# Patient Record
Sex: MALE | Race: WHITE | NOT HISPANIC OR LATINO | Employment: OTHER | URBAN - METROPOLITAN AREA
[De-identification: names, ages, dates, MRNs, and addresses within clinical notes are randomized per-mention and may not be internally consistent; named-entity substitution may affect disease eponyms.]

---

## 2023-02-21 LAB
ALANINE AMINOTRANSFERASE (SGPT) (U/L) IN SER/PLAS: 19 U/L (ref 10–52)
ASPARTATE AMINOTRANSFERASE (SGOT) (U/L) IN SER/PLAS: 19 U/L (ref 9–39)
CHOLESTEROL (MG/DL) IN SER/PLAS: 146 MG/DL (ref 0–199)
CHOLESTEROL IN HDL (MG/DL) IN SER/PLAS: 44.3 MG/DL
CHOLESTEROL/HDL RATIO: 3.3
LDL: 87 MG/DL (ref 0–99)
PROSTATE SPECIFIC AG (NG/ML) IN SER/PLAS: 3.25 NG/ML (ref 0–4)
TRIGLYCERIDE (MG/DL) IN SER/PLAS: 76 MG/DL (ref 0–149)
VLDL: 15 MG/DL (ref 0–40)

## 2023-07-31 ENCOUNTER — HOSPITAL ENCOUNTER (OUTPATIENT)
Dept: DATA CONVERSION | Facility: HOSPITAL | Age: 58
End: 2023-07-31
Attending: SURGERY | Admitting: SURGERY
Payer: COMMERCIAL

## 2023-07-31 DIAGNOSIS — Z12.11 ENCOUNTER FOR SCREENING FOR MALIGNANT NEOPLASM OF COLON: ICD-10-CM

## 2023-07-31 DIAGNOSIS — K51.40 INFLAMMATORY POLYPS OF COLON WITHOUT COMPLICATIONS (MULTI): ICD-10-CM

## 2023-08-07 LAB
COMPLETE PATHOLOGY REPORT: NORMAL
CONVERTED CLINICAL DIAGNOSIS-HISTORY: NORMAL
CONVERTED FINAL DIAGNOSIS: NORMAL
CONVERTED FINAL REPORT PDF LINK TO COPY AND PASTE: NORMAL
CONVERTED GROSS DESCRIPTION: NORMAL

## 2023-08-22 DIAGNOSIS — K21.9 GASTROESOPHAGEAL REFLUX DISEASE WITHOUT ESOPHAGITIS: Primary | ICD-10-CM

## 2023-08-22 DIAGNOSIS — K21.9 GASTROESOPHAGEAL REFLUX DISEASE WITHOUT ESOPHAGITIS: ICD-10-CM

## 2023-08-22 RX ORDER — OMEPRAZOLE 20 MG/1
20 CAPSULE, DELAYED RELEASE ORAL DAILY
Qty: 30 CAPSULE | Refills: 0 | Status: SHIPPED | OUTPATIENT
Start: 2023-08-22 | End: 2023-09-11 | Stop reason: SDUPTHER

## 2023-08-22 RX ORDER — OMEPRAZOLE 20 MG/1
20 CAPSULE, DELAYED RELEASE ORAL DAILY
Qty: 90 CAPSULE | OUTPATIENT
Start: 2023-08-22

## 2023-08-22 RX ORDER — OMEPRAZOLE 20 MG/1
20 CAPSULE, DELAYED RELEASE ORAL DAILY
COMMUNITY
End: 2023-08-22 | Stop reason: SDUPTHER

## 2023-08-23 RX ORDER — OMEPRAZOLE 20 MG/1
20 CAPSULE, DELAYED RELEASE ORAL DAILY
Qty: 90 CAPSULE | OUTPATIENT
Start: 2023-08-23

## 2023-08-28 ENCOUNTER — APPOINTMENT (OUTPATIENT)
Dept: PRIMARY CARE | Facility: CLINIC | Age: 58
End: 2023-08-28

## 2023-09-02 LAB
ALANINE AMINOTRANSFERASE (SGPT) (U/L) IN SER/PLAS: 18 U/L (ref 10–52)
ASPARTATE AMINOTRANSFERASE (SGOT) (U/L) IN SER/PLAS: 21 U/L (ref 9–39)
CHOLESTEROL (MG/DL) IN SER/PLAS: 146 MG/DL (ref 0–199)
CHOLESTEROL IN HDL (MG/DL) IN SER/PLAS: 44.1 MG/DL
CHOLESTEROL/HDL RATIO: 3.3
LDL: 79 MG/DL (ref 0–99)
TRIGLYCERIDE (MG/DL) IN SER/PLAS: 115 MG/DL (ref 0–149)
VLDL: 23 MG/DL (ref 0–40)

## 2023-09-11 ENCOUNTER — OFFICE VISIT (OUTPATIENT)
Dept: PRIMARY CARE | Facility: CLINIC | Age: 58
End: 2023-09-11
Payer: COMMERCIAL

## 2023-09-11 VITALS
TEMPERATURE: 98.6 F | OXYGEN SATURATION: 96 % | BODY MASS INDEX: 30.99 KG/M2 | WEIGHT: 216 LBS | HEART RATE: 94 BPM | SYSTOLIC BLOOD PRESSURE: 122 MMHG | DIASTOLIC BLOOD PRESSURE: 78 MMHG

## 2023-09-11 DIAGNOSIS — I25.10 CORONARY ARTERY DISEASE INVOLVING NATIVE CORONARY ARTERY OF NATIVE HEART WITHOUT ANGINA PECTORIS: ICD-10-CM

## 2023-09-11 DIAGNOSIS — K21.9 GASTROESOPHAGEAL REFLUX DISEASE WITHOUT ESOPHAGITIS: Primary | ICD-10-CM

## 2023-09-11 DIAGNOSIS — Z12.5 SCREENING FOR MALIGNANT NEOPLASM OF PROSTATE: ICD-10-CM

## 2023-09-11 DIAGNOSIS — E78.5 HYPERLIPIDEMIA, UNSPECIFIED HYPERLIPIDEMIA TYPE: ICD-10-CM

## 2023-09-11 DIAGNOSIS — Z23 ENCOUNTER FOR IMMUNIZATION: ICD-10-CM

## 2023-09-11 PROCEDURE — 99214 OFFICE O/P EST MOD 30 MIN: CPT | Performed by: PHYSICIAN ASSISTANT

## 2023-09-11 PROCEDURE — 1036F TOBACCO NON-USER: CPT | Performed by: PHYSICIAN ASSISTANT

## 2023-09-11 PROCEDURE — 90471 IMMUNIZATION ADMIN: CPT | Performed by: PHYSICIAN ASSISTANT

## 2023-09-11 PROCEDURE — 90686 IIV4 VACC NO PRSV 0.5 ML IM: CPT | Performed by: PHYSICIAN ASSISTANT

## 2023-09-11 RX ORDER — FLUTICASONE PROPIONATE 50 MCG
2 SPRAY, SUSPENSION (ML) NASAL DAILY
COMMUNITY
Start: 2023-07-17 | End: 2023-09-11 | Stop reason: WASHOUT

## 2023-09-11 RX ORDER — ACETAMINOPHEN 160 MG/5ML
SUSPENSION, ORAL (FINAL DOSE FORM) ORAL DAILY
COMMUNITY

## 2023-09-11 RX ORDER — ATORVASTATIN CALCIUM 40 MG/1
40 TABLET, FILM COATED ORAL NIGHTLY
COMMUNITY
Start: 2022-11-17 | End: 2023-11-15 | Stop reason: SDUPTHER

## 2023-09-11 RX ORDER — ZINC GLUCONATE 50 MG
1 TABLET ORAL DAILY
COMMUNITY
Start: 2021-10-26

## 2023-09-11 RX ORDER — ASPIRIN 81 MG/1
81 TABLET ORAL DAILY
COMMUNITY

## 2023-09-11 RX ORDER — OMEPRAZOLE 20 MG/1
20 CAPSULE, DELAYED RELEASE ORAL DAILY
Qty: 90 CAPSULE | Refills: 1 | Status: SHIPPED | OUTPATIENT
Start: 2023-09-11 | End: 2024-03-04 | Stop reason: SDUPTHER

## 2023-09-11 RX ORDER — AZELASTINE 1 MG/ML
2 SPRAY, METERED NASAL 2 TIMES DAILY
COMMUNITY
Start: 2023-06-14 | End: 2023-09-11 | Stop reason: WASHOUT

## 2023-09-11 ASSESSMENT — ENCOUNTER SYMPTOMS
PALPITATIONS: 0
SHORTNESS OF BREATH: 0
ABDOMINAL PAIN: 0

## 2023-09-11 NOTE — PROGRESS NOTES
Subjective   Patient ID: Marcello Cowan is a 58 y.o. male who presents for GERD (Recheck) and Hyperlipidemia (Review BW).    HPI   Patient presents for hyperlipidemia, GERD, and CAD.      GERD: Condition has been well controlled since last visit.  Taking omeprazole as prescribed No medication side effects noted.  Denies abdominal pain.  No breakthrough reflux.      Hyperlipidemia: Taking Atorvastatin as prescribed. No medication side effects noted  Doing exercise every other day.    LDL (mg/dL)   Date Value   09/02/2023 79   02/21/2023 87   08/31/2022 73   12/22/2021 81     Triglycerides (mg/dL)   Date Value   09/02/2023 115   02/21/2023 76     HDL (mg/dL)   Date Value   09/02/2023 44.1   02/21/2023 44.3     CAD: Doing well. Sees cardiologist Dr Roca --last there 9/7/23.   Denies CP or SOB. Supposed to follow up in a year.     Did a lot of traveling this summer so not as good with diet. Trying to restart eating mediterranean diet.    Did colonoscopy with Dr Clifford on 7/31/23 that showed inflammatory polyp. Repeat in 10 years.      reports that he has never smoked. He has quit using smokeless tobacco.  His smokeless tobacco use included chew.      Review of Systems   Respiratory:  Negative for shortness of breath.    Cardiovascular:  Negative for chest pain and palpitations.   Gastrointestinal:  Negative for abdominal pain.      reports that he has never smoked. He has quit using smokeless tobacco.  His smokeless tobacco use included chew.    Review of Systems   Respiratory:  Negative for shortness of breath.    Cardiovascular:  Negative for chest pain and palpitations.   Gastrointestinal:  Negative for abdominal pain.     Objective   /78   Pulse 94   Temp 37 °C (98.6 °F)   Wt 98 kg (216 lb)   SpO2 96%   BMI 30.99 kg/m²     Physical Exam  Vitals and nursing note reviewed.   Constitutional:       Appearance: Normal appearance. He is well-developed.   Eyes:      General: No scleral icterus.  Cardiovascular:       Rate and Rhythm: Normal rate and regular rhythm.      Heart sounds: No murmur heard.  Pulmonary:      Effort: Pulmonary effort is normal.      Breath sounds: Normal breath sounds.   Abdominal:      Palpations: Abdomen is soft. There is no mass.      Tenderness: There is no abdominal tenderness.   Musculoskeletal:      Cervical back: Neck supple.   Skin:     General: Skin is warm and dry.   Neurological:      Mental Status: He is alert.       Assessment/Plan   Diagnoses and all orders for this visit:  GERD  -     omeprazole (PriLOSEC) 20 mg DR capsule; Take 1 capsule (20 mg) by mouth once daily.  Hyperlipidemia, unspecified hyperlipidemia type  -     Aspartate Aminotransferase; Future  -     Alanine Aminotransferase; Future  -     Lipid Panel; Future  CAD  -     Lipid Panel; Future  Screening for malignant neoplasm of prostate  -     Prostate Specific Antigen; Future  Encounter for immunization  -     Flu vaccine (IIV4) age 6 months and greater, preservative free       Reviewed labs.   Discussed diet and exercise, and encouraged weight loss.   Refilled meds.   Flu shot given.   Follow up in 6 months for recheck GERD, hyperlipidemia, CAD with fasting labs the week before.

## 2023-09-29 VITALS — HEIGHT: 70 IN | WEIGHT: 209.44 LBS | BODY MASS INDEX: 29.98 KG/M2

## 2023-11-15 DIAGNOSIS — E78.5 HYPERLIPIDEMIA, UNSPECIFIED HYPERLIPIDEMIA TYPE: Primary | ICD-10-CM

## 2023-11-15 RX ORDER — ATORVASTATIN CALCIUM 40 MG/1
40 TABLET, FILM COATED ORAL NIGHTLY
Qty: 90 TABLET | Refills: 3 | Status: SHIPPED | OUTPATIENT
Start: 2023-11-15 | End: 2024-11-14

## 2023-11-15 NOTE — TELEPHONE ENCOUNTER
Received refill request from [facility] for Atorvastatin 40 mg    Last appointment: 9/7/23 with Dr. Roca  Next appointment: 9/10/24 with Dr. Roca  Labs labs: 9/2/23 Lipid checked.   10/2021 saw Lamar Reyes

## 2024-02-21 DIAGNOSIS — K21.9 GASTROESOPHAGEAL REFLUX DISEASE WITHOUT ESOPHAGITIS: ICD-10-CM

## 2024-02-21 RX ORDER — OMEPRAZOLE 20 MG/1
20 CAPSULE, DELAYED RELEASE ORAL DAILY
Qty: 90 CAPSULE | Refills: 1 | OUTPATIENT
Start: 2024-02-21

## 2024-02-27 ENCOUNTER — LAB (OUTPATIENT)
Dept: LAB | Facility: LAB | Age: 59
End: 2024-02-27
Payer: COMMERCIAL

## 2024-02-27 DIAGNOSIS — I25.10 CORONARY ARTERY DISEASE INVOLVING NATIVE CORONARY ARTERY OF NATIVE HEART WITHOUT ANGINA PECTORIS: ICD-10-CM

## 2024-02-27 DIAGNOSIS — Z12.5 SCREENING FOR MALIGNANT NEOPLASM OF PROSTATE: ICD-10-CM

## 2024-02-27 DIAGNOSIS — E78.5 HYPERLIPIDEMIA, UNSPECIFIED HYPERLIPIDEMIA TYPE: ICD-10-CM

## 2024-02-27 LAB
ALT SERPL W P-5'-P-CCNC: 18 U/L (ref 10–52)
AST SERPL W P-5'-P-CCNC: 21 U/L (ref 9–39)
CHOLEST SERPL-MCNC: 167 MG/DL (ref 0–199)
CHOLESTEROL/HDL RATIO: 3.4
HDLC SERPL-MCNC: 49.6 MG/DL
LDLC SERPL CALC-MCNC: 94 MG/DL
NON HDL CHOLESTEROL: 117 MG/DL (ref 0–149)
PSA SERPL-MCNC: 5.13 NG/ML
TRIGL SERPL-MCNC: 118 MG/DL (ref 0–149)
VLDL: 24 MG/DL (ref 0–40)

## 2024-02-27 PROCEDURE — 84460 ALANINE AMINO (ALT) (SGPT): CPT

## 2024-02-27 PROCEDURE — 36415 COLL VENOUS BLD VENIPUNCTURE: CPT

## 2024-02-27 PROCEDURE — 80061 LIPID PANEL: CPT

## 2024-02-27 PROCEDURE — 84450 TRANSFERASE (AST) (SGOT): CPT

## 2024-02-27 PROCEDURE — 84153 ASSAY OF PSA TOTAL: CPT

## 2024-03-04 ENCOUNTER — OFFICE VISIT (OUTPATIENT)
Dept: PRIMARY CARE | Facility: CLINIC | Age: 59
End: 2024-03-04
Payer: COMMERCIAL

## 2024-03-04 VITALS
HEART RATE: 68 BPM | SYSTOLIC BLOOD PRESSURE: 122 MMHG | OXYGEN SATURATION: 99 % | BODY MASS INDEX: 30.85 KG/M2 | DIASTOLIC BLOOD PRESSURE: 80 MMHG | WEIGHT: 215 LBS | TEMPERATURE: 98 F

## 2024-03-04 DIAGNOSIS — K21.9 GASTROESOPHAGEAL REFLUX DISEASE WITHOUT ESOPHAGITIS: Primary | ICD-10-CM

## 2024-03-04 DIAGNOSIS — I25.10 CORONARY ARTERY DISEASE INVOLVING NATIVE CORONARY ARTERY OF NATIVE HEART WITHOUT ANGINA PECTORIS: ICD-10-CM

## 2024-03-04 DIAGNOSIS — R97.20 ELEVATED PSA: ICD-10-CM

## 2024-03-04 DIAGNOSIS — K51.40 PSEUDOPOLYPOSIS OF COLON WITHOUT COMPLICATION, UNSPECIFIED PART OF COLON (MULTI): ICD-10-CM

## 2024-03-04 DIAGNOSIS — E78.5 HYPERLIPIDEMIA, UNSPECIFIED HYPERLIPIDEMIA TYPE: ICD-10-CM

## 2024-03-04 PROBLEM — J30.9 ALLERGIC RHINITIS: Status: ACTIVE | Noted: 2023-06-12

## 2024-03-04 PROCEDURE — 1036F TOBACCO NON-USER: CPT | Performed by: PHYSICIAN ASSISTANT

## 2024-03-04 PROCEDURE — 99214 OFFICE O/P EST MOD 30 MIN: CPT | Performed by: PHYSICIAN ASSISTANT

## 2024-03-04 RX ORDER — OMEPRAZOLE 20 MG/1
20 CAPSULE, DELAYED RELEASE ORAL DAILY
Qty: 90 CAPSULE | Refills: 1 | Status: SHIPPED | OUTPATIENT
Start: 2024-03-04

## 2024-03-04 ASSESSMENT — ENCOUNTER SYMPTOMS
PALPITATIONS: 0
ABDOMINAL PAIN: 0
SHORTNESS OF BREATH: 0

## 2024-03-04 NOTE — PROGRESS NOTES
Subjective   Patient ID: Marcello Cowan is a 58 y.o. male who presents for Coronary Artery Disease, GERD, and Hyperlipidemia (Review BW).    HPI   Patient presents for hyperlipidemia, GERD, and CAD.      GERD: Condition has been well controlled since last visit.  Taking and tolerating omeprazole as prescribed. Denies abdominal pain.  No breakthrough reflux.      Hyperlipidemia: Taking Atorvastatin as prescribed. No medication side effects noted  Doing exercise every day since 1/6/24 (treadmill/bike and wts).  Started losing wt again. Eating mediterranean diet.  LDL Calculated (mg/dL)   Date Value   02/27/2024 94     LDL (mg/dL)   Date Value   09/02/2023 79   02/21/2023 87     Triglycerides (mg/dL)   Date Value   02/27/2024 118   09/02/2023 115   02/21/2023 76     HDL-Cholesterol (mg/dL)   Date Value   02/27/2024 49.6     HDL (mg/dL)   Date Value   09/02/2023 44.1   02/21/2023 44.3       CAD: Doing well. Sees cardiologist Dr Roca --last there 9/7/23.   Denies CP or SOB. Supposed to follow up in a year.       Did colonoscopy with Dr Clifford on 7/31/23 that showed inflammatory polyp. Repeat in 10 years.     PSA rising.  Dad had prostate cancer.   No urinary symptoms.   Lab Results   Component Value Date    PSA 5.13 (H) 02/27/2024    PSA 3.25 02/21/2023    PSA 3.64 12/22/2021        reports that he has never smoked. He quit smokeless tobacco use about 5 years ago.  His smokeless tobacco use included chew.      Review of Systems   Respiratory:  Negative for shortness of breath.    Cardiovascular:  Negative for chest pain and palpitations.   Gastrointestinal:  Negative for abdominal pain.     Objective   /80   Pulse 68   Temp 36.7 °C (98 °F)   Wt 97.5 kg (215 lb)   SpO2 99%   BMI 30.85 kg/m²     Physical Exam  Vitals and nursing note reviewed.   Constitutional:       Appearance: Normal appearance. He is well-developed.   Eyes:      General: No scleral icterus.  Cardiovascular:      Rate and Rhythm: Normal rate  and regular rhythm.      Heart sounds: No murmur heard.  Pulmonary:      Effort: Pulmonary effort is normal.      Breath sounds: Normal breath sounds.   Abdominal:      Palpations: Abdomen is soft. There is no mass.      Tenderness: There is no abdominal tenderness.   Musculoskeletal:      Cervical back: Neck supple.   Skin:     General: Skin is warm and dry.   Neurological:      Mental Status: He is alert.       Assessment/Plan   Diagnoses and all orders for this visit:  GERD  -     omeprazole (PriLOSEC) 20 mg DR capsule; Take 1 capsule (20 mg) by mouth once daily.  Hyperlipidemia, unspecified hyperlipidemia type  -     Comprehensive Metabolic Panel; Future  -     Lipid Panel; Future  CAD  -     Comprehensive Metabolic Panel; Future  Elevated PSA  -     Referral to Urology; Future  Pseudopolyposis of colon without complication, unspecified part of colon (CMS/HCC)     Reviewed labs.   Discussed diet and exercise, and encouraged weight loss.   Refilled meds.   Referred to urologist, Dr Perdomo due to rising PSA.   Follow up in 6 months for recheck GERD, hyperlipidemia, CAD with fasting labs the week before.

## 2024-04-04 ENCOUNTER — OFFICE VISIT (OUTPATIENT)
Dept: UROLOGY | Facility: CLINIC | Age: 59
End: 2024-04-04
Payer: COMMERCIAL

## 2024-04-04 DIAGNOSIS — N40.1 BENIGN PROSTATIC HYPERPLASIA WITH LOWER URINARY TRACT SYMPTOMS, SYMPTOM DETAILS UNSPECIFIED: Primary | ICD-10-CM

## 2024-04-04 DIAGNOSIS — R97.20 ELEVATED PSA: ICD-10-CM

## 2024-04-04 LAB
POC BILIRUBIN, URINE: NEGATIVE
POC BLOOD, URINE: NEGATIVE
POC GLUCOSE, URINE: NEGATIVE MG/DL
POC KETONES, URINE: NEGATIVE MG/DL
POC LEUKOCYTES, URINE: NEGATIVE
POC NITRITE,URINE: NEGATIVE
POC PH, URINE: 5 PH
POC PROTEIN, URINE: NEGATIVE MG/DL
POC SPECIFIC GRAVITY, URINE: >=1.03
POC UROBILINOGEN, URINE: 0.2 EU/DL

## 2024-04-04 PROCEDURE — 81003 URINALYSIS AUTO W/O SCOPE: CPT | Performed by: UROLOGY

## 2024-04-04 PROCEDURE — 99203 OFFICE O/P NEW LOW 30 MIN: CPT | Performed by: UROLOGY

## 2024-04-04 NOTE — PROGRESS NOTES
04/04/2024  58-year-old gentleman presents an elevated PSA 5.13.  Voiding fine, nocturia 0-1    Patient has no nausea, no vomiting, no fever.    KORIN: 1+, no nodule    Exam: Circumcised, normal penis, normal testes bilaterally    We discussed mild elevated PSA, prostate biopsy now versus monitoring PSA  We discussed benign prostate hypertrophy with mild voiding symptom  All the questions were answered, the patient expressed understanding and agreed to the plan.    Impression  Elevated PSA-mild  BPH-mild symptoms    Plan  Conservative management  Repeat PSA in 6 months  Watch voiding  Appointment in 6 months    Chief Complaint   Patient presents with    Elevated PSA     Patient here today as a new patient for elevated PSA.  Patient states urine flow has changed, nocturia x 1  PSA 2/27/24  5.13        Physical Exam     TODAYS LAB RESULTS:      Component  Ref Range & Units 10:20   POC Glucose, Urine  NEGATIVE mg/dl NEGATIVE   POC Bilirubin, Urine  NEGATIVE NEGATIVE   POC Ketones, Urine  NEGATIVE mg/dl NEGATIVE   POC Specific Gravity, Urine  1.005 - 1.035 >=1.030   POC Blood, Urine  NEGATIVE NEGATIVE   POC PH, Urine  No Reference Range Established PH 5.0   POC Protein, Urine  NEGATIVE, 30 (1+) mg/dl NEGATIVE   POC Urobilinogen, Urine  0.2, 1.0 EU/DL 0.2   Poc Nitrite, Urine  NEGATIVE NEGATIVE   POC Leukocytes, Urine  NEGATIVE NEGATIVE       ASSESSMENT&PLAN:      IMPRESSIONS:    PSA  2/27/2024 5.13  2/21/2023 3.25  12/22/2021 3.64  11/3/2020 2.71  10/5/2019 2.81

## 2024-08-14 DIAGNOSIS — K21.9 GASTROESOPHAGEAL REFLUX DISEASE WITHOUT ESOPHAGITIS: ICD-10-CM

## 2024-08-19 RX ORDER — OMEPRAZOLE 20 MG/1
20 CAPSULE, DELAYED RELEASE ORAL DAILY
Qty: 90 CAPSULE | Refills: 1 | OUTPATIENT
Start: 2024-08-19

## 2024-08-30 ENCOUNTER — LAB (OUTPATIENT)
Dept: LAB | Facility: LAB | Age: 59
End: 2024-08-30
Payer: COMMERCIAL

## 2024-08-30 DIAGNOSIS — I25.10 CORONARY ARTERY DISEASE INVOLVING NATIVE CORONARY ARTERY OF NATIVE HEART WITHOUT ANGINA PECTORIS: ICD-10-CM

## 2024-08-30 DIAGNOSIS — E78.5 HYPERLIPIDEMIA, UNSPECIFIED HYPERLIPIDEMIA TYPE: ICD-10-CM

## 2024-08-30 LAB
ALBUMIN SERPL BCP-MCNC: 4.3 G/DL (ref 3.4–5)
ALP SERPL-CCNC: 65 U/L (ref 33–136)
ALT SERPL W P-5'-P-CCNC: 18 U/L (ref 10–52)
ANION GAP SERPL CALC-SCNC: 11 MMOL/L (ref 10–20)
AST SERPL W P-5'-P-CCNC: 19 U/L (ref 9–39)
BILIRUB SERPL-MCNC: 0.6 MG/DL (ref 0–1.2)
BUN SERPL-MCNC: 17 MG/DL (ref 6–23)
CALCIUM SERPL-MCNC: 8.6 MG/DL (ref 8.6–10.3)
CHLORIDE SERPL-SCNC: 106 MMOL/L (ref 98–107)
CHOLEST SERPL-MCNC: 152 MG/DL (ref 0–199)
CHOLESTEROL/HDL RATIO: 3.7
CO2 SERPL-SCNC: 27 MMOL/L (ref 21–32)
CREAT SERPL-MCNC: 0.71 MG/DL (ref 0.5–1.3)
EGFRCR SERPLBLD CKD-EPI 2021: >90 ML/MIN/1.73M*2
GLUCOSE SERPL-MCNC: 107 MG/DL (ref 74–99)
HDLC SERPL-MCNC: 40.9 MG/DL
LDLC SERPL CALC-MCNC: 82 MG/DL
NON HDL CHOLESTEROL: 111 MG/DL (ref 0–149)
POTASSIUM SERPL-SCNC: 4.1 MMOL/L (ref 3.5–5.3)
PROT SERPL-MCNC: 6.4 G/DL (ref 6.4–8.2)
SODIUM SERPL-SCNC: 140 MMOL/L (ref 136–145)
TRIGL SERPL-MCNC: 148 MG/DL (ref 0–149)
VLDL: 30 MG/DL (ref 0–40)

## 2024-08-30 PROCEDURE — 80053 COMPREHEN METABOLIC PANEL: CPT

## 2024-08-30 PROCEDURE — 36415 COLL VENOUS BLD VENIPUNCTURE: CPT

## 2024-08-30 PROCEDURE — 80061 LIPID PANEL: CPT

## 2024-09-03 ENCOUNTER — LAB (OUTPATIENT)
Dept: LAB | Facility: LAB | Age: 59
End: 2024-09-03
Payer: COMMERCIAL

## 2024-09-03 DIAGNOSIS — R97.20 ELEVATED PSA: ICD-10-CM

## 2024-09-03 DIAGNOSIS — N40.1 BENIGN PROSTATIC HYPERPLASIA WITH LOWER URINARY TRACT SYMPTOMS, SYMPTOM DETAILS UNSPECIFIED: ICD-10-CM

## 2024-09-03 LAB — PSA SERPL-MCNC: 4.35 NG/ML

## 2024-09-03 PROCEDURE — 36415 COLL VENOUS BLD VENIPUNCTURE: CPT

## 2024-09-03 PROCEDURE — 84153 ASSAY OF PSA TOTAL: CPT

## 2024-09-05 ENCOUNTER — APPOINTMENT (OUTPATIENT)
Dept: PRIMARY CARE | Facility: CLINIC | Age: 59
End: 2024-09-05
Payer: COMMERCIAL

## 2024-09-10 ENCOUNTER — OFFICE VISIT (OUTPATIENT)
Dept: CARDIOLOGY | Facility: HOSPITAL | Age: 59
End: 2024-09-10
Payer: COMMERCIAL

## 2024-09-10 VITALS
HEART RATE: 76 BPM | HEIGHT: 70 IN | WEIGHT: 219 LBS | DIASTOLIC BLOOD PRESSURE: 82 MMHG | BODY MASS INDEX: 31.35 KG/M2 | SYSTOLIC BLOOD PRESSURE: 118 MMHG

## 2024-09-10 DIAGNOSIS — I25.10 CORONARY ARTERY DISEASE INVOLVING NATIVE CORONARY ARTERY OF NATIVE HEART WITHOUT ANGINA PECTORIS: Primary | ICD-10-CM

## 2024-09-10 DIAGNOSIS — R07.9 CHEST PAIN: ICD-10-CM

## 2024-09-10 PROCEDURE — 99213 OFFICE O/P EST LOW 20 MIN: CPT | Performed by: INTERNAL MEDICINE

## 2024-09-10 PROCEDURE — 93005 ELECTROCARDIOGRAM TRACING: CPT | Performed by: INTERNAL MEDICINE

## 2024-09-10 PROCEDURE — 93010 ELECTROCARDIOGRAM REPORT: CPT | Performed by: INTERNAL MEDICINE

## 2024-09-10 PROCEDURE — 1036F TOBACCO NON-USER: CPT | Performed by: INTERNAL MEDICINE

## 2024-09-10 PROCEDURE — 3008F BODY MASS INDEX DOCD: CPT | Performed by: INTERNAL MEDICINE

## 2024-09-10 NOTE — PATIENT INSTRUCTIONS
Continue all current medications as prescribed.  Dr. Roca has ordered a stress test to ensure your heart is getting adequate blood flow and there is no evidence of any blockages within the heart arteries.    Followup with Dr. Roca after the above test.    If you have any questions or cardiac concerns, please call our office at [phone].

## 2024-09-10 NOTE — PROGRESS NOTES
"Counseling:  The patient was counseled regarding diagnostic results, instructions for management, risk factor reductions, prognosis, patient and family education, impressions, risks and benefits of treatment options and importance of compliance with treatment.      Chief Complaint:   The patient presents today for annual followup of CAD and dyslipidemia.     History Of Present Illness:    Marcello Cowan is a 58 year old male patient who presents today for annual followup of CAD and hyperlipidemia. His PMH is significant for CAD s/p PCI of LAD in 09/2019, GERD and hyperlipidemia. Approximately 2 weeks ago, the patient states that he developed fatigue and dizziness while cutting firewood on a hot day. He subsequently developed chest tightness with radiation to his left arm. He reports persistent and almost constant chest discomfort. The patient denies any SOB. BP has been stable. EKG today shows RBBB, which is stable from prior. The patient is compliant with his prescribed medications.        Last Recorded Vitals:  Vitals:    09/10/24 1232   BP: 118/82   Pulse: 76   Weight: 99.3 kg (219 lb)   Height: 1.778 m (5' 10\")       Past Surgical History:  He has a past surgical history that includes Appendectomy (08/29/2017); Other surgical history (06/06/2022); and CT angio aorta and bilateral iliofemoral runoff w and or wo IV contrast (9/18/2019).      Social History:  He reports that he has never smoked. He quit smokeless tobacco use about 5 years ago.  His smokeless tobacco use included chew. He reports that he does not currently use alcohol. He reports that he does not use drugs.    Family History:  Family History   Problem Relation Name Age of Onset    Hypertension Father      Hyperlipidemia Father      Prostate cancer Father          Allergies:  Amoxicillin, Ciprofloxacin, Diltiazem hcl, and Isosorbide mononitrate    Outpatient Medications:  Current Outpatient Medications   Medication Instructions    aspirin 81 mg, oral, " Daily    atorvastatin (LIPITOR) 40 mg, oral, Nightly    coenzyme Q-10 200 mg capsule oral, Daily    fish oil concentrate (Omega-3) 120-180 mg capsule 1 g, oral, Daily    omeprazole (PRILOSEC) 20 mg, oral, Daily    zinc gluconate 50 mg tablet 1 tablet, oral, Daily     Review of Systems   Cardiovascular:  Positive for chest pain.   All other systems reviewed and are negative.     Physical Exam:  Constitutional:       Appearance: Healthy appearance. Not in distress.   Neck:      Vascular: No JVR. JVD normal.   Pulmonary:      Effort: Pulmonary effort is normal.      Breath sounds: Normal breath sounds. No wheezing. No rhonchi. No rales.   Chest:      Chest wall: Not tender to palpatation.   Cardiovascular:      PMI at left midclavicular line. Normal rate. Regular rhythm. Normal S1. Normal S2.       Murmurs: There is no murmur.      No gallop.  No click. No rub.   Pulses:     Intact distal pulses.   Edema:     Peripheral edema absent.   Abdominal:      General: Bowel sounds are normal.      Palpations: Abdomen is soft.      Tenderness: There is no abdominal tenderness.   Musculoskeletal: Normal range of motion.         General: No tenderness. Skin:     General: Skin is warm and dry.   Neurological:      General: No focal deficit present.      Mental Status: Alert and oriented to person, place and time.          Last Labs:  CBC -  Lab Results   Component Value Date    WBC 9.1 05/21/2022    HGB 15.2 05/21/2022    HCT 43.0 05/21/2022    MCV 87 05/21/2022     05/21/2022       CMP -  Lab Results   Component Value Date    CALCIUM 8.6 08/30/2024    PHOS 2.8 09/19/2019    PROT 6.4 08/30/2024    ALBUMIN 4.3 08/30/2024    AST 19 08/30/2024    ALT 18 08/30/2024    ALKPHOS 65 08/30/2024    BILITOT 0.6 08/30/2024       LIPID PANEL -   Lab Results   Component Value Date    CHOL 152 08/30/2024    TRIG 148 08/30/2024    HDL 40.9 08/30/2024    CHHDL 3.7 08/30/2024    LDLF 79 09/02/2023    VLDL 30 08/30/2024    NHDL 111 08/30/2024        RENAL FUNCTION PANEL -   Lab Results   Component Value Date    GLUCOSE 107 (H) 08/30/2024     08/30/2024    K 4.1 08/30/2024     08/30/2024    CO2 27 08/30/2024    ANIONGAP 11 08/30/2024    BUN 17 08/30/2024    CREATININE 0.71 08/30/2024    GFRMALE 79 05/21/2022    CALCIUM 8.6 08/30/2024    PHOS 2.8 09/19/2019    ALBUMIN 4.3 08/30/2024        Lab Results   Component Value Date    HGBA1C 5.9 07/07/2020       Last Cardiology Tests:  08/25/2022 - TTE  1. Left ventricular systolic function is normal with a 60-65% estimated ejection fraction.  2. Moderately increased left ventricular septal thickness.     07/07/2020 - Cardiac Catheterization (LH)  1. Mild non-obstructive coronary artery disease.  2. Left Ventricular end-diastolic pressure = 13.  3. Normal LV filling pressures.     09/19/2019 - Vascular Lab Venous Duplex Ultrasound for DVT  1. Right Lower Venous: No evidence of acute deep vein thrombus visualized in the right lower extremity.  2. Left Lower Venous: No evidence of acute deep vein thrombus visualized in the left lower extremity. A hematoma is noted in the inguinal area at the saphenofemoral junction with some extrinsic compression of the common femoral vein. There is no apparent intraluminal thrombus; repeat imaging in 3-5 days is suggested if clinically appropriate.   3. Additional Findings; Hematoma.  4. Comparison: Compared with study from 9/18/2019, Prior study was a CT scan. There is no evidence of DVT in bilateral lower extremities. A hematoma is noted in the inguinal area at saphenofemoral junction with extrinsic venous compression. This may be below the area imaged on CT.     09/18/2019 - Ultrasound Duplex Lower Extremity Bilateral   1. No evidence of pseudoaneurysm or fluid collection. The evaluated bilateral iliac arteries as well as the common and proximal femoral arteries are unremarkable.  2. Partial thrombus in the left common femoral vein.     09/11/2019 - Cardiac  Catheterization/PCI  1. Single vessel coronary artery disease with proximal left anterior descending involvement.  2. Successful PCI of LAD and septal perforators.     09/03/2019 - Cardiac Catheterization (LH)  1. Single vessel coronary artery disease with proximal left anterior descending involvement.  2. Elevated LV filling pressures.  3. Left Ventricular end-diastolic pressure = 19.  4. Normal LV systolic function.      09/13/2017 - Exercise Stress Echo  1. The resting ejection fraction was estimated at 55 to 60% with a peak exercise ejection fraction estimated at 65 to 70%.  2. Normal left ventricular size and function during peak stress.  3. Normal left ventricular size and function.  4. Negative adequate exercise echo for inducible ischemia at a high (approx 13-14 MET) work load.  5. ST changes as described are less specific than echo findings (particularly given postural changes).  6. Unchanged chest discomfort before, during, and after exercise.  7. The adequate level of stress was achieved.    Lab review: I have personally reviewed the laboratory result(s).    Assessment/Plan   1) CAD s/p PCI of LAD Sept 2019  On ASA 81 mg daily, atorvastatin 40 mg daily  Home monitoring of BP  Approximately 2 weeks ago, patient developed fatigue and dizziness while cutting firewood in hot weather  Subsequently developed chest tightness with radiation to left arm  Reports persistent and almost constant chest discomfort  Denies SOB  BP stable  EKG shows RBBB - stable from prior  Continue current medical Rx   Check nuclear stress  F/U after testing      2) Hyperlipidemia  Goal LDL <70  On atorvastatin 40 mg daily  Lipid panel 08/30/2024 with LDL of 82; not at goal  Continue current medical Rx      3) Intermittent Tachycardia  Apple Watch reveals sinus tachycardia  Normal echo  Negative TSH  Encouraged to exercise  Stable       Scribe Attestation  By signing my name below, I, Caleb Shetty   attest that this  documentation has been prepared under the direction and in the presence of Luciano Roca MD.

## 2024-09-13 ENCOUNTER — HOSPITAL ENCOUNTER (OUTPATIENT)
Dept: RADIOLOGY | Facility: HOSPITAL | Age: 59
Discharge: HOME | End: 2024-09-13
Payer: COMMERCIAL

## 2024-09-13 ENCOUNTER — HOSPITAL ENCOUNTER (OUTPATIENT)
Dept: CARDIOLOGY | Facility: HOSPITAL | Age: 59
Discharge: HOME | End: 2024-09-13
Payer: COMMERCIAL

## 2024-09-13 DIAGNOSIS — R07.9 CHEST PAIN: ICD-10-CM

## 2024-09-13 DIAGNOSIS — I25.10 CORONARY ARTERY DISEASE INVOLVING NATIVE CORONARY ARTERY OF NATIVE HEART WITHOUT ANGINA PECTORIS: ICD-10-CM

## 2024-09-13 PROCEDURE — 93017 CV STRESS TEST TRACING ONLY: CPT

## 2024-09-13 PROCEDURE — 78452 HT MUSCLE IMAGE SPECT MULT: CPT

## 2024-09-13 PROCEDURE — 3430000001 HC RX 343 DIAGNOSTIC RADIOPHARMACEUTICALS: Performed by: STUDENT IN AN ORGANIZED HEALTH CARE EDUCATION/TRAINING PROGRAM

## 2024-09-13 PROCEDURE — 93016 CV STRESS TEST SUPVJ ONLY: CPT | Performed by: STUDENT IN AN ORGANIZED HEALTH CARE EDUCATION/TRAINING PROGRAM

## 2024-09-13 PROCEDURE — 93018 CV STRESS TEST I&R ONLY: CPT | Performed by: STUDENT IN AN ORGANIZED HEALTH CARE EDUCATION/TRAINING PROGRAM

## 2024-09-13 PROCEDURE — A9502 TC99M TETROFOSMIN: HCPCS | Performed by: STUDENT IN AN ORGANIZED HEALTH CARE EDUCATION/TRAINING PROGRAM

## 2024-09-15 NOTE — H&P (VIEW-ONLY)
"Counseling:  The patient was counseled regarding diagnostic results, instructions for management, risk factor reductions, prognosis, patient and family education, impressions, risks and benefits of treatment options and importance of compliance with treatment.      Chief Complaint:   The patient presents today for 1-week followup of chest pain s/p stress test.      History Of Present Illness:    Marcello Cowan is a 59 year old male patient who presents today for 1-week followup of chest pain s/p stress test. His PMH is significant for CAD s/p PCI of LAD in 09/2019, GERD and hyperlipidemia. Stress testing performed 09/13/2024 was negative for ischemia. Today, the patient reports persistent occasional left-sided chest pressure. He also reports a 3-week history of frequent headaches, a symptom he forgot to report at last office visit. These symptoms continue to worry him as they are similar to what he experienced prior to his PCI in 2019.      Last Recorded Vitals:  Vitals:    09/16/24 0949   BP: 134/80   BP Location: Left arm   Pulse: 55   SpO2: 97%   Weight: 99.8 kg (220 lb)   Height: 1.778 m (5' 10\")       Past Surgical History:  He has a past surgical history that includes Appendectomy (08/29/2017); Other surgical history (06/06/2022); and CT angio aorta and bilateral iliofemoral runoff w and or wo IV contrast (9/18/2019).      Social History:  He reports that he has never smoked. He quit smokeless tobacco use about 5 years ago.  His smokeless tobacco use included chew. He reports that he does not currently use alcohol. He reports that he does not use drugs.    Family History:  Family History   Problem Relation Name Age of Onset    Hypertension Father      Hyperlipidemia Father      Prostate cancer Father          Allergies:  Amoxicillin, Ciprofloxacin, Diltiazem hcl, and Isosorbide mononitrate    Outpatient Medications:  Current Outpatient Medications   Medication Instructions    aspirin 81 mg, oral, Daily    " atorvastatin (LIPITOR) 40 mg, oral, Nightly    coenzyme Q-10 200 mg capsule oral, Daily    fish oil concentrate (Omega-3) 120-180 mg capsule 1 g, oral, Daily    omeprazole (PRILOSEC) 20 mg, oral, Daily    zinc gluconate 50 mg tablet 1 tablet, oral, Daily     Review of Systems   Cardiovascular:         Occasional left-sided chest pressure   Neurological:  Positive for headaches.   All other systems reviewed and are negative.     Physical Exam:  Constitutional:       Appearance: Healthy appearance. Not in distress.   Neck:      Vascular: No JVR. JVD normal.   Pulmonary:      Effort: Pulmonary effort is normal.      Breath sounds: Normal breath sounds. No wheezing. No rhonchi. No rales.   Chest:      Chest wall: Not tender to palpatation.   Cardiovascular:      PMI at left midclavicular line. Normal rate. Regular rhythm. Normal S1. Normal S2.       Murmurs: There is no murmur.      No gallop.  No click. No rub.   Pulses:     Intact distal pulses.   Edema:     Peripheral edema absent.   Abdominal:      General: Bowel sounds are normal.      Palpations: Abdomen is soft.      Tenderness: There is no abdominal tenderness.   Musculoskeletal: Normal range of motion.         General: No tenderness. Skin:     General: Skin is warm and dry.   Neurological:      General: No focal deficit present.      Mental Status: Alert and oriented to person, place and time.          Last Labs:  CBC -  Lab Results   Component Value Date    WBC 9.1 05/21/2022    HGB 15.2 05/21/2022    HCT 43.0 05/21/2022    MCV 87 05/21/2022     05/21/2022       CMP -  Lab Results   Component Value Date    CALCIUM 8.6 08/30/2024    PHOS 2.8 09/19/2019    PROT 6.4 08/30/2024    ALBUMIN 4.3 08/30/2024    AST 19 08/30/2024    ALT 18 08/30/2024    ALKPHOS 65 08/30/2024    BILITOT 0.6 08/30/2024       LIPID PANEL -   Lab Results   Component Value Date    CHOL 152 08/30/2024    TRIG 148 08/30/2024    HDL 40.9 08/30/2024    CHHDL 3.7 08/30/2024    LDLF 79  09/02/2023    VLDL 30 08/30/2024    NHDL 111 08/30/2024       RENAL FUNCTION PANEL -   Lab Results   Component Value Date    GLUCOSE 107 (H) 08/30/2024     08/30/2024    K 4.1 08/30/2024     08/30/2024    CO2 27 08/30/2024    ANIONGAP 11 08/30/2024    BUN 17 08/30/2024    CREATININE 0.71 08/30/2024    GFRMALE 79 05/21/2022    CALCIUM 8.6 08/30/2024    PHOS 2.8 09/19/2019    ALBUMIN 4.3 08/30/2024        Lab Results   Component Value Date    HGBA1C 5.9 07/07/2020       Last Cardiology Tests:  09/13/2024 - Stress Test  1. SPECT myocardial perfusion stress test in response to exercise stress demonstrates no evidence of reversible ischemia or infarction. Normal LV ejection fraction of 61%. Moderate coronary artery calcifications are noted.   2. Adequate level of stress achieved. No clinical evidence for ischemia at maximal workload. Borderline EKG abnormalities possibly related to underlying RBBB vs ischemic changes.    08/25/2022 - TTE  1. Left ventricular systolic function is normal with a 60-65% estimated ejection fraction.  2. Moderately increased left ventricular septal thickness.     07/07/2020 - Cardiac Catheterization (LH)  1. Mild non-obstructive coronary artery disease.  2. Left Ventricular end-diastolic pressure = 13.  3. Normal LV filling pressures.     09/19/2019 - Vascular Lab Venous Duplex Ultrasound for DVT  1. Right Lower Venous: No evidence of acute deep vein thrombus visualized in the right lower extremity.  2. Left Lower Venous: No evidence of acute deep vein thrombus visualized in the left lower extremity. A hematoma is noted in the inguinal area at the saphenofemoral junction with some extrinsic compression of the common femoral vein. There is no apparent intraluminal thrombus; repeat imaging in 3-5 days is suggested if clinically appropriate.   3. Additional Findings; Hematoma.  4. Comparison: Compared with study from 9/18/2019, Prior study was a CT scan. There is no evidence of DVT in  bilateral lower extremities. A hematoma is noted in the inguinal area at saphenofemoral junction with extrinsic venous compression. This may be below the area imaged on CT.     09/18/2019 - Ultrasound Duplex Lower Extremity Bilateral   1. No evidence of pseudoaneurysm or fluid collection. The evaluated bilateral iliac arteries as well as the common and proximal femoral arteries are unremarkable.  2. Partial thrombus in the left common femoral vein.     09/11/2019 - Cardiac Catheterization/PCI  1. Single vessel coronary artery disease with proximal left anterior descending involvement.  2. Successful PCI of LAD and septal perforators.     09/03/2019 - Cardiac Catheterization (LH)  1. Single vessel coronary artery disease with proximal left anterior descending involvement.  2. Elevated LV filling pressures.  3. Left Ventricular end-diastolic pressure = 19.  4. Normal LV systolic function.      09/13/2017 - Exercise Stress Echo  1. The resting ejection fraction was estimated at 55 to 60% with a peak exercise ejection fraction estimated at 65 to 70%.  2. Normal left ventricular size and function during peak stress.  3. Normal left ventricular size and function.  4. Negative adequate exercise echo for inducible ischemia at a high (approx 13-14 MET) work load.  5. ST changes as described are less specific than echo findings (particularly given postural changes).  6. Unchanged chest discomfort before, during, and after exercise.  7. The adequate level of stress was achieved.    Diagnostic review: I have personally reviewed the result(s) of the Stress Test.     Assessment/Plan   1) CAD s/p PCI of LAD Sept 2019  On ASA 81 mg daily, atorvastatin 40 mg daily  Home monitoring of BP  Approximately 2 weeks ago, patient developed fatigue and dizziness while cutting firewood in hot weather  Subsequently developed chest tightness with radiation to left arm  Denies SOB  BP stable  EKG shows RBBB - stable from prior  Stress 09/13/2024  negative for ischemia   Reports persistent left-sided chest pressure   Reports a 3 week h/o frequent headaches  Plan for CC/PTCA s/t Persistent Symptoms Despite Negative Stress   Risks, benefits, alternatives of cardiac catheterization possible angioplasty and stenting including risk of death, stroke, MI, bleeding complications and renal failure were explained to patient and patient agreed to proceed.     2) Hyperlipidemia  Goal LDL <70  On atorvastatin 40 mg daily  Lipid panel 08/30/2024 with LDL of 82; not at goal  Continue current medical Rx      3) Intermittent Tachycardia  Apple Watch reveals sinus tachycardia  Normal echo  Negative TSH  Encouraged to exercise  Stable       Scribe Attestation  By signing my name below, I, Caleb Shetty   attest that this documentation has been prepared under the direction and in the presence of Luciano Roca MD.

## 2024-09-16 ENCOUNTER — OFFICE VISIT (OUTPATIENT)
Dept: CARDIOLOGY | Facility: HOSPITAL | Age: 59
End: 2024-09-16
Payer: COMMERCIAL

## 2024-09-16 VITALS
SYSTOLIC BLOOD PRESSURE: 134 MMHG | WEIGHT: 220 LBS | BODY MASS INDEX: 31.5 KG/M2 | DIASTOLIC BLOOD PRESSURE: 80 MMHG | HEART RATE: 55 BPM | HEIGHT: 70 IN | OXYGEN SATURATION: 97 %

## 2024-09-16 DIAGNOSIS — I20.89 ANGINA OF EFFORT (CMS-HCC): Primary | ICD-10-CM

## 2024-09-16 PROCEDURE — 99213 OFFICE O/P EST LOW 20 MIN: CPT | Performed by: INTERNAL MEDICINE

## 2024-09-16 PROCEDURE — 3008F BODY MASS INDEX DOCD: CPT | Performed by: INTERNAL MEDICINE

## 2024-09-16 RX ORDER — SODIUM CHLORIDE 9 MG/ML
100 INJECTION, SOLUTION INTRAVENOUS CONTINUOUS
OUTPATIENT
Start: 2024-09-16

## 2024-09-16 ASSESSMENT — ENCOUNTER SYMPTOMS: HEADACHES: 1

## 2024-09-16 NOTE — PATIENT INSTRUCTIONS
Continue all current medications as prescribed.  Dr. Roca has placed orders for a heart catheterization to evaluate for any blockages within your heart arteries. Instructions: Nothing to eat or drink from midnight the night before the procedure. If you take any morning medications, these can be taken with small sips of water; no coffee or tea. Please have someone with you to drive you home as you will receive light sedation and driving is not recommended. Please see the folder provided to you today for further information.   Followup with Dr. Roca after the above procedure.    If you have any questions or cardiac concerns, please call our office at [phone].

## 2024-09-17 ENCOUNTER — APPOINTMENT (OUTPATIENT)
Dept: PRIMARY CARE | Facility: CLINIC | Age: 59
End: 2024-09-17
Payer: COMMERCIAL

## 2024-09-17 ENCOUNTER — TELEPHONE (OUTPATIENT)
Dept: CARDIOLOGY | Facility: HOSPITAL | Age: 59
End: 2024-09-17

## 2024-09-17 PROBLEM — I20.89 ANGINA OF EFFORT (CMS-HCC): Status: ACTIVE | Noted: 2024-09-16

## 2024-09-17 NOTE — TELEPHONE ENCOUNTER
Patient is set up for his [facility] for 9/27 arrival time 7:30 and start time 8:30. Called patient to give him that information and to get bloodwork and he understood.     Told patient that [facility] will contact him but he said he has two or three done and he knows how to prep.

## 2024-09-17 NOTE — TELEPHONE ENCOUNTER
Called cath instructions to patient including review of date and arrival time.  Verified no need for dye prep.  Labs needs labs prior.  Nothing to eat or drink after midnight except ASA with a sip of water the morning of the cath. Previous creatinine within normal limits. You will need a .  Bring your ID, insurance cards and either a perfect list of the medications you are swallowing or the medications in original bottles.  Wear comfortable clothing.  There is a possibility of staying over night for observation so pack a bag with necessities for that.  Patient correctly repeated instructions, verbalized understanding and is agreeable to the plan.

## 2024-09-23 ENCOUNTER — TELEPHONE (OUTPATIENT)
Dept: CARDIOLOGY | Facility: HOSPITAL | Age: 59
End: 2024-09-23
Payer: COMMERCIAL

## 2024-09-23 NOTE — TELEPHONE ENCOUNTER
Called cath instructions to patient including review of date and arrival time.  Verified no need for dye prep.  Labs ordered.  Nothing to eat or drink after midnight except ASA with a sip of water the morning of the cath.  You will need a .  Bring your ID, insurance cards and either a perfect list of the medications you are swallowing or the medications in original bottles.  Wear comfortable clothing.  There is a possibility of staying over night for observation so pack a bag with necessities for that.  Patient correctly repeated instructions, verbalized understanding and is agreeable to the plan.

## 2024-09-24 ENCOUNTER — LAB (OUTPATIENT)
Dept: LAB | Facility: LAB | Age: 59
End: 2024-09-24
Payer: COMMERCIAL

## 2024-09-24 DIAGNOSIS — I20.89 ANGINA OF EFFORT (CMS-HCC): ICD-10-CM

## 2024-09-24 LAB
ANION GAP SERPL CALC-SCNC: 11 MMOL/L (ref 10–20)
BUN SERPL-MCNC: 14 MG/DL (ref 6–23)
CALCIUM SERPL-MCNC: 8.9 MG/DL (ref 8.6–10.3)
CHLORIDE SERPL-SCNC: 107 MMOL/L (ref 98–107)
CO2 SERPL-SCNC: 28 MMOL/L (ref 21–32)
CREAT SERPL-MCNC: 0.65 MG/DL (ref 0.5–1.3)
EGFRCR SERPLBLD CKD-EPI 2021: >90 ML/MIN/1.73M*2
ERYTHROCYTE [DISTWIDTH] IN BLOOD BY AUTOMATED COUNT: 12 % (ref 11.5–14.5)
GLUCOSE SERPL-MCNC: 121 MG/DL (ref 74–99)
HCT VFR BLD AUTO: 41.5 % (ref 41–52)
HGB BLD-MCNC: 14.1 G/DL (ref 13.5–17.5)
MCH RBC QN AUTO: 30.7 PG (ref 26–34)
MCHC RBC AUTO-ENTMCNC: 34 G/DL (ref 32–36)
MCV RBC AUTO: 90 FL (ref 80–100)
NRBC BLD-RTO: 0 /100 WBCS (ref 0–0)
PLATELET # BLD AUTO: 189 X10*3/UL (ref 150–450)
POTASSIUM SERPL-SCNC: 4 MMOL/L (ref 3.5–5.3)
RBC # BLD AUTO: 4.59 X10*6/UL (ref 4.5–5.9)
SODIUM SERPL-SCNC: 142 MMOL/L (ref 136–145)
WBC # BLD AUTO: 6 X10*3/UL (ref 4.4–11.3)

## 2024-09-24 PROCEDURE — 85027 COMPLETE CBC AUTOMATED: CPT

## 2024-09-24 PROCEDURE — 80048 BASIC METABOLIC PNL TOTAL CA: CPT

## 2024-09-24 PROCEDURE — 36415 COLL VENOUS BLD VENIPUNCTURE: CPT

## 2024-09-27 ENCOUNTER — HOSPITAL ENCOUNTER (OUTPATIENT)
Facility: HOSPITAL | Age: 59
Setting detail: OUTPATIENT SURGERY
Discharge: HOME | End: 2024-09-27
Attending: INTERNAL MEDICINE | Admitting: INTERNAL MEDICINE
Payer: COMMERCIAL

## 2024-09-27 VITALS
TEMPERATURE: 98 F | HEART RATE: 56 BPM | BODY MASS INDEX: 30.13 KG/M2 | RESPIRATION RATE: 16 BRPM | OXYGEN SATURATION: 98 % | WEIGHT: 210 LBS | DIASTOLIC BLOOD PRESSURE: 82 MMHG | SYSTOLIC BLOOD PRESSURE: 121 MMHG

## 2024-09-27 DIAGNOSIS — I20.89 ANGINA OF EFFORT (CMS-HCC): Primary | ICD-10-CM

## 2024-09-27 DIAGNOSIS — I20.0 UNSTABLE ANGINA (MULTI): ICD-10-CM

## 2024-09-27 PROCEDURE — C1894 INTRO/SHEATH, NON-LASER: HCPCS | Performed by: INTERNAL MEDICINE

## 2024-09-27 PROCEDURE — 2500000005 HC RX 250 GENERAL PHARMACY W/O HCPCS: Performed by: INTERNAL MEDICINE

## 2024-09-27 PROCEDURE — C1760 CLOSURE DEV, VASC: HCPCS | Performed by: INTERNAL MEDICINE

## 2024-09-27 PROCEDURE — 2500000004 HC RX 250 GENERAL PHARMACY W/ HCPCS (ALT 636 FOR OP/ED): Performed by: INTERNAL MEDICINE

## 2024-09-27 PROCEDURE — 93454 CORONARY ARTERY ANGIO S&I: CPT | Performed by: INTERNAL MEDICINE

## 2024-09-27 PROCEDURE — 7100000009 HC PHASE TWO TIME - INITIAL BASE CHARGE: Performed by: INTERNAL MEDICINE

## 2024-09-27 PROCEDURE — 2720000007 HC OR 272 NO HCPCS: Performed by: INTERNAL MEDICINE

## 2024-09-27 PROCEDURE — C1887 CATHETER, GUIDING: HCPCS | Performed by: INTERNAL MEDICINE

## 2024-09-27 PROCEDURE — 7100000010 HC PHASE TWO TIME - EACH INCREMENTAL 1 MINUTE: Performed by: INTERNAL MEDICINE

## 2024-09-27 RX ORDER — SODIUM CHLORIDE 9 MG/ML
1000 INJECTION, SOLUTION INTRAVENOUS ONCE AS NEEDED
Status: CANCELLED | OUTPATIENT
Start: 2024-09-27

## 2024-09-27 RX ORDER — ACETAMINOPHEN 325 MG/1
650 TABLET ORAL EVERY 6 HOURS PRN
Status: CANCELLED | OUTPATIENT
Start: 2024-09-27

## 2024-09-27 RX ORDER — LIDOCAINE HYDROCHLORIDE 20 MG/ML
INJECTION, SOLUTION INFILTRATION; PERINEURAL AS NEEDED
Status: DISCONTINUED | OUTPATIENT
Start: 2024-09-27 | End: 2024-09-27 | Stop reason: HOSPADM

## 2024-09-27 RX ORDER — SODIUM CHLORIDE 9 MG/ML
1 INJECTION, SOLUTION INTRAVENOUS CONTINUOUS
Status: CANCELLED | OUTPATIENT
Start: 2024-09-27 | End: 2024-09-27

## 2024-09-27 RX ORDER — FENTANYL CITRATE 50 UG/ML
INJECTION, SOLUTION INTRAMUSCULAR; INTRAVENOUS AS NEEDED
Status: DISCONTINUED | OUTPATIENT
Start: 2024-09-27 | End: 2024-09-27 | Stop reason: HOSPADM

## 2024-09-27 RX ORDER — ACETAMINOPHEN 650 MG/1
650 SUPPOSITORY RECTAL EVERY 6 HOURS PRN
Status: CANCELLED | OUTPATIENT
Start: 2024-09-27

## 2024-09-27 RX ORDER — HEPARIN SODIUM 1000 [USP'U]/ML
INJECTION, SOLUTION INTRAVENOUS; SUBCUTANEOUS AS NEEDED
Status: DISCONTINUED | OUTPATIENT
Start: 2024-09-27 | End: 2024-09-27 | Stop reason: HOSPADM

## 2024-09-27 RX ORDER — ACETAMINOPHEN 160 MG/5ML
650 SOLUTION ORAL EVERY 6 HOURS PRN
Status: CANCELLED | OUTPATIENT
Start: 2024-09-27

## 2024-09-27 RX ORDER — MIDAZOLAM HYDROCHLORIDE 1 MG/ML
INJECTION, SOLUTION INTRAMUSCULAR; INTRAVENOUS AS NEEDED
Status: DISCONTINUED | OUTPATIENT
Start: 2024-09-27 | End: 2024-09-27 | Stop reason: HOSPADM

## 2024-09-27 RX ORDER — SODIUM CHLORIDE 9 MG/ML
100 INJECTION, SOLUTION INTRAVENOUS CONTINUOUS
Status: DISCONTINUED | OUTPATIENT
Start: 2024-09-27 | End: 2024-09-30 | Stop reason: HOSPADM

## 2024-09-27 ASSESSMENT — PAIN SCALES - GENERAL
PAINLEVEL_OUTOF10: 0 - NO PAIN

## 2024-09-27 ASSESSMENT — COLUMBIA-SUICIDE SEVERITY RATING SCALE - C-SSRS
2. HAVE YOU ACTUALLY HAD ANY THOUGHTS OF KILLING YOURSELF?: NO
1. IN THE PAST MONTH, HAVE YOU WISHED YOU WERE DEAD OR WISHED YOU COULD GO TO SLEEP AND NOT WAKE UP?: NO
6. HAVE YOU EVER DONE ANYTHING, STARTED TO DO ANYTHING, OR PREPARED TO DO ANYTHING TO END YOUR LIFE?: NO

## 2024-09-27 ASSESSMENT — PAIN - FUNCTIONAL ASSESSMENT
PAIN_FUNCTIONAL_ASSESSMENT: 0-10

## 2024-09-27 NOTE — DISCHARGE INSTRUCTIONS
If you have any questions, please call the Cath Lab Nurse Practitioner Daniellaisatu Gillette at [phone] and leave a message. She will return your call the same day if calling before 3 PM, M-F. If you call on the weekend you can expect a call back on Monday morning. You may also call the Cath Lab at [phone] M-F, 7-3:30 with any questions. Weekends and after hours please call your cardiologist office number to reach a physician on call. The heart group office number is [phone]     Office will call you in 1-2 days to set up a follow up with Dr. Roca.             CARDIAC CATHETERIZATION DISCHARGE INSTRUCTIONS     FOR SUDDEN AND SEVERE CHEST PAIN, SHORTNESS OF BREATH, EXCESSIVE BLEEDING, SIGNS OF STROKE, OR CHANGES IN MENTAL STATUS YOU SHOULD CALL 911 IMMEDIATELY.     If your provider has prescribed aspirin and/or clopidogrel (Plavix), or prasugrel (Effient), or ticagrelor (Brilinta), DO NOT STOP THESE MEDICATIONS for any reason without talking to your cardiologist first. If any of these were prescribed, you must take them every day without missing a single dose. If you are getting low on these medications, contact your provider immediately for a refill.     FOR NEXT 24 HOURS  - Upon discharge, you should return home and rest for the remainder of the day and evening. You do not have to stay on bed rest but should not be very active.  It is recommended a responsible adult be with you for the first 24 hours after the procedure.    - No driving for 24 hours after procedure. Please arrange for someone to drive you home from the hospital today.     - Do not drive, operate machinery, or use power tools for 24 hours after your procedure.     - Do not make any legal decisions for 24 hours after your procedure.     - Do not drink alcoholic beverages for 24 hours after your procedure.    WOUND CARE   *FOR FEMORAL (LEG) ACCESS*  ·      Avoid heavy lifting (over 10 pounds) for 7 days, squatting or excessive bending for 2 days,  and strenuous exercise for 7 days.  ·      No submerged bathing, swimming, or hot tubs for the next 7 days, or until fully healed.  ·      Avoid sexual activity for 3-4 days until any groin discomfort has ceased.     *FOR RADIAL (WRIST) ACCESS*  ·      No lifting more than 5 pounds or excessive use of the wrist for 24 hours - for example, treat your wrist as if it is sprained.  ·      Do not engage in vigorous activities (tennis, golf, bowling, weights) for at least 48 hours after the procedure.  ·      Do not submerge the wrist for 7 days after the procedure.  ·      You should expect mild tingling in your hand and tenderness at the puncture site for up to 3 days.    - The transparent dressing should be removed from the site 24 hours after the procedure.  Wash the site gently with soap and water. Rinse well and pat dry. Keep the area clean and dry. You may apply a Band-Aid to the site. Avoid lotions, ointments, or powders until fully healed.     - You may shower the day after your procedure.      - It is normal to notice a small bruise around the puncture site and/or a small grape sized or smaller lump. Any large bruising or large lump warrants a call to the office.     - If bleeding should occur, lay down and apply pressure to the affected area for 10 minutes.  If the bleeding stops notify your physician.  If there is a large amount of bleeding or spurting of blood CALL 911 immediately.  DO NOT drive yourself to the hospital.    - You may experience some tenderness, bruising or minimal inflammation.  If you have any concerns, you may contact the Cath Lab or if any of these symptoms become excessive, contact your cardiologist or go to the emergency room.     OTHER INSTRUCTIONS  - You may take acetaminophen (Tylenol) as directed for discomfort.  If pain is not relieved with acetaminophen (Tylenol), contact your doctor.    - If you notice or experience any of the following, you should notify your doctor or seek  medical attention  Chest pain or discomfort  Change in mental status or weakness in extremities.  Dizziness, light headedness, or feeling faint.  Change in the site where the procedure was performed, such as bleeding or an increased area of bruising or swelling.  Tingling, numbness, pain, or coolness in the leg/arm beyond the site where the procedure was performed.  Signs of infection (i.e. shaking chills, temperature > 100 degrees Fahrenheit, warmth, redness) in the leg/arm area where the procedure was performed.  Changes in urination   Bloody or black stools  Vomiting blood  Severe nose bleeds  Any excessive bleeding    - If you DO NOT have an appointment with your cardiologist within 2-4 weeks following your procedure, please contact their office.      Important ways to reduce your risk of coronary artery disease by healthy diet, maintaining a healthy weight, exercising regularly and stop using tobacco products.     Mediterranean Diet    About this topic  This is a heart healthy diet. It is based on widely used foods and cooking styles from many countries around the [place]. The main pattern for the diet is more plant foods and monounsaturated fats, or good fats, like olive oil. Protein in this diet comes from seafood, legumes, nuts, seeds, and poultry and eggs in lowered amounts. You will also eat more whole grains, vegetables, and fruits and moderate amounts of alcohol are also included. This diet has less red meats, dairy products, and processed foods.    What will the results be?  Your diet will have less saturated fat, cholesterol, calories, sodium, and added sugars. Your diet will be higher in fiber. This will help to keep your blood sugar steady. This diet lowers the chance of heart disease and other health problems.  What lifestyle changes are needed?  If you do not often eat this way, you will need to change your eating habits. Be sure to get regular exercise. It is believed to help the health  "benefits of this diet.  What changes to diet are needed?  You may need to limit the amount of red meat and processed foods in your diet. Ask your dietitian for help planning meals that are right for you.  What foods are good to eat?  Plenty of fish and other seafood  Fresh, frozen, or canned fruits and vegetables  Nuts and nut butters and dried beans, lentils, or peas  Foods high in fiber like whole grains and whole grain products  Olive oil (good fat), peanut or canola oil, margarine, or spreads that list vegetable oil as the first ingredient and do not contain trans fat or partially hydrogenated oil  Small amounts of poultry and eggs  What foods should be limited or avoided?  Red meats  Sweets, desserts, and processed foods  Butter, oils, and fats that contain trans fats or are hydrogenated or partially hydrogenated  Gravies and sauces  What problems could happen?  Your weight may rise because your diet will be higher in fat from olive oil and nuts.  You may have lower iron levels. Be sure to eat foods rich in iron. Also, eat foods rich in vitamin C. This will help your body take in iron.  You may have lower calcium levels because you are eating less dairy products. Ask your doctor if you need to take a calcium supplement.  Wine is often thought of as part of a Mediterranean diet. It is not needed and you may choose not to include it. Avoid wine if you are prone to alcohol abuse or are pregnant. Also, avoid it if you are at risk for breast cancer, have liver problems, or have other illnesses that make it important for you to not have alcohol.  When do I need to call the doctor?  If you have any concerns about your diet     Health risks of obesity    The Basics  Written by the doctors and editors at [facility]  What does it mean to have obesity? -- Doctors use a calculation called \"body mass index,\" or \"BMI,\" to decide whether a person is underweight, at a healthy weight, overweight, or has obesity.  Your BMI will " "tell you which category you are in based on your weight and height (figure 1):  ?If your BMI is between 25 and 29.9, you are overweight.  ?If your BMI is 30 or greater, you have obesity.  Obesity is a problem, because it increases the risks of many different health problems. It can also make it hard for you to move, breathe, and do other things that people who are at a healthy weight can do easily.  What are the health risks of obesity? -- Having obesity increases a person's risk of developing many health problems. Here are just a few examples:  ?Diabetes  ?High blood pressure  ?High cholesterol  ?Heart disease (including heart attacks)  ?Stroke  ?Sleep apnea (a disorder in which you stop breathing for short periods while asleep)  ?Asthma  ?Cancer  Does having obesity shorten a person's life? -- Yes. Studies show that people with obesity die younger than people who are a healthy weight. They also show that the risk of death goes up the heavier a person is. The degree of increased risk depends on how long the person has had obesity, and on what other medical problems they have.  People with \"central obesity\" might also be at risk of dying younger. Central obesity means carrying extra weight in the belly area, even if the BMI is normal.  Should I see an expert? -- Yes. If you are overweight or have obesity, you can talk to your doctor or nurse. They might have suggestions for healthy ways to lose weight. It can also help to work with a dietitian (food and nutrition expert). A dietitian can help you choose healthy foods and plan meals.  Are there medical treatments that can help me lose weight? -- Yes. There are medicines and surgery to help with weight loss. But those treatments are only for people who have not been able to lose weight through lifestyle changes such as diet and exercise. Also, weight loss treatments do not take the place of diet and exercise. People who have those treatments must also change how they " eat and how active they are.  What can I do to prevent the problems caused by obesity? -- The best thing that you can do is lose weight. But even if weight loss is not possible, you can improve your health and lower your risk if you:  ?Become more active - Many types of physical activity can help, including walking. You can start with a few minutes a day and add more as you get stronger and build up your endurance. Anything that gets your body moving is good for you.  ?Improve your diet - It is healthy to have regular meal times, eat smaller portions, and not skip meals. Limit sweets, and avoid processed foods. Try to eat more vegetables and fruits instead.  ?Quit smoking (if you smoke) - Some people start eating more after they stop smoking, so try to make healthy food choices. Even if it increases your appetite, quitting smoking is still one of the best things that you can do to improve your health.  ?Limit alcohol - For females, drink no more than 1 drink a day. For males, drink no more than 2 drinks a day.  What causes obesity? -- The thing that increases a person's risk the most is having an unhealthy lifestyle. Most people develop obesity because they eat too much, eat unhealthy foods, and move too little. That's especially true of people who watch too much TV. But there are also other things that seem to increase the risk of obesity that many people do not know about. Here are some things that might affect a person's chance of developing obesity:  ?Mother's habits during and after pregnancy - People who eat a lot of calories, have diabetes, or smoke during pregnancy have a higher chance of having babies who have obesity as adults. Also, babies who drink formula might be more likely than  babies to develop obesity later in life.  ?Habits and weight gain during childhood - Children or teens who are overweight or have obesity are more likely to become have obesity as an adult.  ?Sleeping too little -  "People who do not get enough sleep are more likely to develop obesity than people who sleep enough.  ?Taking certain medicines - Long-term use of certain medicines, such as some medicines to treat depression, can cause weight gain. If you are concerned that 1 of your medicines might be making you gain weight, talk to your doctor or nurse. They might be able to switch you to a different medicine instead.  ?Certain hormonal conditions - Some hormonal problems can increase the risk of developing obesity. For example, a condition called \"polycystic ovary syndrome\" can cause weight gain, along with other symptoms like irregular periods.  What if I want to get pregnant? -- If you are overweight or have obesity, it might be harder to get pregnant. For males, obesity can also cause sex problems, like having trouble getting or keeping an erection. This is more likely if you also have high blood pressure or diabetes.  What if my child has obesity? -- In children, obesity has many of the same risks as it does in adults. For example, it can increase the risk of diabetes, high blood pressure, asthma, and sleep apnea. It can also cause added problems related to childhood. For example, obesity can make children grow faster than normal and cause girls to go through puberty earlier than usual.  All topics are updated as new evidence becomes available and our peer review process is complete.  This topic retrieved from [employer] on: Jan 11, 2024.  Topic 92626 Version 17.0  Release: 31.6.4 - C32.10  © 2024 Up[employer]Date, Inc. and/or its affiliates. All rights reserved.  figure 1: Your body mass index (BMI)        If you use tobacco products please review   Quitting smoking    The Basics  Written by the doctors and editors at [employer]  What are the benefits of quitting smoking? -- Quitting smoking can dramatically improve your health and help you live longer. It lowers your risk of heart disease, lung disease, kidney failure, cancer, " "infection, stomach problems, and diabetes.  Quitting smoking can also lower your chances of getting osteoporosis, a condition that makes your bones weak.  Quitting is not easy for most people, and it might take several tries to completely quit. But help and support are available. Quitting smoking will improve your health no matter how old you are, even if you have smoked for a long time.  What should I do if I want to quit smoking? -- It's a good idea to start by talking with your doctor or nurse. It is possible to quit on your own, without help. But getting help greatly increases your chances of quitting successfully.  When you are ready to quit, you will make a plan to:  ?Set a quit date.  ?Tell your family and friends that you plan to quit.  ?Plan ahead for the challenges you will face, such as cigarette cravings.  ?Remove cigarettes from your home, car, and work.  How can my doctor or nurse help? -- Your doctor or nurse can give you advice on the best way to quit. They can also give you medicines to:  ?Reduce your craving for cigarettes  ?Reduce your \"withdrawal\" symptoms (symptoms that happen when you stop smoking)  Your doctor or nurse can also help you find a counselor to talk to. For most people who are trying to quit smoking, it works best to use both medicines and counseling.  You can also get help from a free phone line (5-635-QUITNOW, or 1-550.307.7388) or go online to www.smokefree.gov.  What are the symptoms of withdrawal? -- When you stop smoking, you might have symptoms such as:  ?Trouble sleeping  ?Feeling irritable, anxious, or restless  ?Getting frustrated or angry  ?Having trouble thinking clearly  These symptoms can be hard to deal with, which is why it can be so hard to quit. But medicines can help.  Some people who stop smoking become temporarily depressed. Some people need treatment for depression, such as counseling or medicines or both. People with depression might:  ?No longer enjoy or " care about doing the things they used to like to do  ?Feel sad, down, hopeless, nervous, or cranky most of the day, almost every day  ?Lose or gain weight  ?Sleep too much or too little  ?Feel tired or like they have no energy  ?Feel guilty or like they are worth nothing  ?Forget things or feel confused  ?Move and speak more slowly than usual  ?Act restless or have trouble staying still  ?Think about death or suicide  If you think you might be depressed, tell your doctor or nurse right away. They can talk to you about your symptoms and recommend treatment if needed.  Get help right away if you are thinking of hurting or killing yourself! -- Sometimes, people with depression think of hurting or killing themselves. If you ever feel like you might hurt yourself, help is available:  ?In the , contact the [facility] Suicide & Crisis Lifeline:  To speak to someone, call or text [facility].  To talk to someone online, go to www.[facility].org/chat.  ?Call your doctor or nurse and tell them that it is an emergency.  ?Call for an ambulance (in the [place] and [place], call 9-1-1).  ?Go to the emergency department at your local hospital.  If you think your partner might have depression, or if you are worried that they might hurt themselves, get them help right away.  How does counseling work? -- A counselor can help you figure out:  ?What triggers you to want to smoke, and how to handle these situations  ?How to resist cravings  ?What you can do differently if you have tried to quit before  You can meet with a counselor in 1-on-1 sessions or as part of a group. There are other ways to get counseling, too, such as over the phone, through text messaging, or online.  How do medicines help you stop smoking? -- Different medicines work in different ways:  ?Nicotine replacement therapy - Nicotine is the main drug in cigarettes, and the reason they are addictive. These medicines reduce your body's craving for nicotine. They also help with withdrawal  "symptoms.  There are different forms of nicotine replacement, including skin patches, lozenges, gum, nasal sprays, and inhalers. Most can be bought without a prescription. Also, health insurance might cover some or all of the cost.  It often helps to use 2 forms of nicotine replacement. For example, you might wear a patch all the time, plus use gum or lozenges when you get a craving to smoke.  ?Varenicline - Varenicline (brand names: Chantix, Champix) is a prescription medicine that reduces withdrawal symptoms and cigarette cravings. Varenicline can increase the effects of alcohol in some people. It's a good idea to limit drinking while you're taking it, at least until you know how it affects you.  Even if you are not yet ready to commit to a quit date, varenicline can help reduce cravings. This can make it easier to quit when you are ready.  ?Bupropion - Bupropion (sample brand names: Wellbutrin, Zyban) is a prescription medicine that reduces your desire to smoke. It is also available in a generic version, which is cheaper than the brand name medicines. Doctors do not usually prescribe bupropion for people with seizures or who have had seizures in the past.  It might also be helpful to combine nicotine replacement with bupropion or varenicline. In some cases, a person might even take both bupropion and varenicline. Your doctor or nurse can help you figure out the best combination for you.  What about e-cigarettes? -- Sometimes people wonder if using electronic cigarettes, or \"e-cigarettes,\" can help them quit smoking. Using e-cigarettes is also called \"vaping.\" Doctors do not recommend e-cigarettes in place of using of medicines and counseling. That's because e-cigarettes still contain nicotine as well as other substances that might be harmful. It's also not clear how they can affect a person's health in the long term.  What if I am pregnant and I smoke? -- If you are pregnant, it's really important for the health " of your baby that you quit. Ask your doctor what options you have, and what is safest for your baby.  What if I have already smoked for a long time? -- The longer you have smoked, the higher your chances are of having health problems. But it is never too late to quit smoking. It helps your health even if you are older or have smoked for many years. The best thing you can do to lower your chance of having a health problem caused by smoking is to quit.  Will I gain weight if I quit? -- You might gain a few pounds. This can be frustrating for some people. But it's important to remember that you are improving your health by quitting smoking. You can help prevent gaining a lot of weight by staying active and eating a healthy diet.  What if I am not able to quit? -- If you don't quit on your first try, or if you quit but then start smoking again, don't give up hope. Lots of people have to try more than once before they are able to completely quit.  It might help to try to understand why quitting did not work. There might be something you can do differently when you try again. It can help to figure out which situations make you want to smoke, so you can avoid them.  What else can I do to improve my chances of quitting? -- You can:  ?Get regular exercise. Any type of physical activity, even gentle forms of movement, is good for your health. Physical activity can also help reduce stress.  ?Stay away from people who smoke and places that make you want to smoke. If people close to you smoke, ask them to quit with you or avoid smoking around you.  ?Carry gum, hard candy, or something to put in your mouth. If you get a craving for a cigarette, try 1 of these instead.  ?Don't give up, even if you start smoking again. It takes most people a few tries before they succeed.  All topics are updated as new evidence becomes available and our peer review process is complete.

## 2024-09-27 NOTE — POST-PROCEDURE NOTE
Physician Transition of Care Summary  Invasive Cardiovascular Lab    Procedure Date: 9/27/2024  Attending:    Stefanie Roca - Primary  Resident/Fellow/Other Assistant: Surgeons and Role:  * No surgeons found with a matching role *    Indications:   Pre-op Diagnosis      * Angina of effort (CMS-HCC) [I20.89]    Post-procedure diagnosis:   Post-op Diagnosis     * Angina of effort (CMS-[facility]) [I20.89]    Procedure(s):   Left Heart Cath  35246 - ND CATH PLMT L HRT & ARTS W/NJX & ANGIO IMG S&I        Procedure Findings:   No significant CAD    Description of the Procedure:   C   RRA>TR band     Complications:   None     Stents/Implants:   Implants       No implant documentation for this case.            Anticoagulation/Antiplatelet Plan:   Continue aspirin 81 mg daily     Estimated Blood Loss:   0 mL    Anesthesia: Moderate Sedation Anesthesia Staff: No anesthesia staff entered.    Any Specimen(s) Removed:   No specimens collected during this procedure.    Disposition:   Home       Electronically signed by: DILLON Xiao, 9/27/2024 1:32 PM

## 2024-09-27 NOTE — PRE-SEDATION DOCUMENTATION
Interventional Cardiology Preprocedure Note    Marcello Cowan   Indication for procedure: The encounter diagnosis was Angina of effort (CMS-HCC).  Patient presents today for a left heart cath.      Relevant review of systems: Patient endorses episode of chest pain yesterday lasting <5min.  Currently denies chest pain and shortness of breath.        /75   Pulse 51   Temp 36.7 °C (98 °F) (Temporal)   Resp 12   Wt 95.3 kg (210 lb)   SpO2 100%   BMI 30.13 kg/m²    Relevant Labs:   Lab Results   Component Value Date    CREATININE 0.65 09/24/2024    EGFR >90 09/24/2024    INR 1.0 07/07/2020    PROTIME 11.3 07/07/2020       Planned Sedation/Anesthesia: Moderate    Airway assessment: normal    Physical Exam  Constitutional:       General: He is not in acute distress.     Appearance: Normal appearance. He is not ill-appearing, toxic-appearing or diaphoretic.   HENT:      Mouth/Throat:      Mouth: Mucous membranes are moist.      Pharynx: Oropharynx is clear.   Eyes:      General: No scleral icterus.  Cardiovascular:      Rate and Rhythm: Regular rhythm.      Pulses: Normal pulses.      Heart sounds: Normal heart sounds. No murmur heard.     No gallop.      Comments: Barbeau Right Radial - Type C  Pulmonary:      Effort: Pulmonary effort is normal. No respiratory distress.      Breath sounds: Normal breath sounds. No wheezing, rhonchi or rales.   Musculoskeletal:      Right lower leg: No edema.      Left lower leg: No edema.   Skin:     General: Skin is warm and dry.   Neurological:      General: No focal deficit present.      Mental Status: He is alert and oriented to person, place, and time.   Psychiatric:         Mood and Affect: Mood normal.         Behavior: Behavior normal.         Thought Content: Thought content normal.         Judgment: Judgment normal.         Mallampati: III (soft and hard palate and base of uvula visible)    ASA Score: ASA 3 - Patient with moderate systemic disease with functional  limitations    Benefits, risks and alternatives of procedure and planned sedation have been discussed with the patient and/or their representative. All questions answered and they agree to proceed.     TASHA VAZQUEZ

## 2024-10-10 NOTE — PROGRESS NOTES
"Counseling:  The patient was counseled regarding diagnostic results, instructions for management, risk factor reductions, prognosis, patient and family education, impressions, risks and benefits of treatment options and importance of compliance with treatment.      Chief Complaint:   The patient presents today for 1-month followup of persistent chest pain s/p LHC.      History Of Present Illness:    Marcello Cowan is a 59 year old male patient who presents today for 1-month followup of persistent chest pain s/p LHC. His PMH is significant for CAD s/p PCI of LAD in 09/2019, GERD and hyperlipidemia. On 09/27/2024, the patient was taken to the cath lab, which revealed mild non-obstructive CAD. Today, the patient states that he is feeling well, having recovered from LHC. He reports that his chest pain has resolved, noting that it was likely muscular in nature secondary to excessive strain. The patient remains compliant with his prescribed medications.      Last Recorded Vitals:  Vitals:    10/11/24 1152   BP: 124/72   BP Location: Left arm   Pulse: 66   SpO2: 97%   Weight: 102 kg (224 lb)   Height: 1.778 m (5' 10\")       Past Surgical History:  He has a past surgical history that includes Appendectomy (08/29/2017); Other surgical history (06/06/2022); CT angio aorta and bilateral iliofemoral runoff w and or wo IV contrast (9/18/2019); and Cardiac catheterization (N/A, 9/27/2024).      Social History:  He reports that he has never smoked. He quit smokeless tobacco use about 5 years ago.  His smokeless tobacco use included chew. He reports that he does not currently use alcohol. He reports that he does not use drugs.    Family History:  Family History   Problem Relation Name Age of Onset    Hypertension Father      Hyperlipidemia Father      Prostate cancer Father          Allergies:  Amoxicillin, Ciprofloxacin, Diltiazem hcl, and Isosorbide mononitrate    Outpatient Medications:  Current Outpatient Medications "   Medication Instructions    ascorbic acid (VITAMIN C) 1,000 mg, oral, Daily    aspirin 81 mg, oral, Daily    atorvastatin (LIPITOR) 40 mg, oral, Nightly    coenzyme Q-10 200 mg capsule oral, Daily    fish oil concentrate (Omega-3) 120-180 mg capsule 1 g, oral, Daily    omeprazole (PRILOSEC) 20 mg, oral, Daily    zinc gluconate 50 mg tablet 1 tablet, oral, Daily     Review of Systems   All other systems reviewed and are negative.     Physical Exam:  Constitutional:       Appearance: Healthy appearance. Not in distress.   Neck:      Vascular: No JVR. JVD normal.   Pulmonary:      Effort: Pulmonary effort is normal.      Breath sounds: Normal breath sounds. No wheezing. No rhonchi. No rales.   Chest:      Chest wall: Not tender to palpatation.   Cardiovascular:      PMI at left midclavicular line. Normal rate. Regular rhythm. Normal S1. Normal S2.       Murmurs: There is no murmur.      No gallop.  No click. No rub.   Pulses:     Intact distal pulses.   Edema:     Peripheral edema absent.   Abdominal:      General: Bowel sounds are normal.      Palpations: Abdomen is soft.      Tenderness: There is no abdominal tenderness.   Musculoskeletal: Normal range of motion.         General: No tenderness. Skin:     General: Skin is warm and dry.   Neurological:      General: No focal deficit present.      Mental Status: Alert and oriented to person, place and time.          Last Labs:  CBC -  Lab Results   Component Value Date    WBC 6.0 09/24/2024    HGB 14.1 09/24/2024    HCT 41.5 09/24/2024    MCV 90 09/24/2024     09/24/2024       CMP -  Lab Results   Component Value Date    CALCIUM 8.9 09/24/2024    PHOS 2.8 09/19/2019    PROT 6.4 08/30/2024    ALBUMIN 4.3 08/30/2024    AST 19 08/30/2024    ALT 18 08/30/2024    ALKPHOS 65 08/30/2024    BILITOT 0.6 08/30/2024       LIPID PANEL -   Lab Results   Component Value Date    CHOL 152 08/30/2024    TRIG 148 08/30/2024    HDL 40.9 08/30/2024    CHHDL 3.7 08/30/2024    LDLF  79 09/02/2023    VLDL 30 08/30/2024    NHDL 111 08/30/2024       RENAL FUNCTION PANEL -   Lab Results   Component Value Date    GLUCOSE 121 (H) 09/24/2024     09/24/2024    K 4.0 09/24/2024     09/24/2024    CO2 28 09/24/2024    ANIONGAP 11 09/24/2024    BUN 14 09/24/2024    CREATININE 0.65 09/24/2024    GFRMALE 79 05/21/2022    CALCIUM 8.9 09/24/2024    PHOS 2.8 09/19/2019    ALBUMIN 4.3 08/30/2024        Lab Results   Component Value Date    HGBA1C 5.9 07/07/2020       Last Cardiology Tests:  09/27/2024 - Cardiac Catheterization (LH)  Mild non-obstructive coronary artery disease.     09/13/2024 - Stress Test  1. SPECT myocardial perfusion stress test in response to exercise stress demonstrates no evidence of reversible ischemia or infarction. Normal LV ejection fraction of 61%. Moderate coronary artery calcifications are noted.   2. Adequate level of stress achieved. No clinical evidence for ischemia at maximal workload. Borderline EKG abnormalities possibly related to underlying RBBB vs ischemic changes.    08/25/2022 - TTE  1. Left ventricular systolic function is normal with a 60-65% estimated ejection fraction.  2. Moderately increased left ventricular septal thickness.     07/07/2020 - Cardiac Catheterization (LH)  1. Mild non-obstructive coronary artery disease.  2. Left Ventricular end-diastolic pressure = 13.  3. Normal LV filling pressures.     09/19/2019 - Vascular Lab Venous Duplex Ultrasound for DVT  1. Right Lower Venous: No evidence of acute deep vein thrombus visualized in the right lower extremity.  2. Left Lower Venous: No evidence of acute deep vein thrombus visualized in the left lower extremity. A hematoma is noted in the inguinal area at the saphenofemoral junction with some extrinsic compression of the common femoral vein. There is no apparent intraluminal thrombus; repeat imaging in 3-5 days is suggested if clinically appropriate.   3. Additional Findings; Hematoma.  4.  Comparison: Compared with study from 9/18/2019, Prior study was a CT scan. There is no evidence of DVT in bilateral lower extremities. A hematoma is noted in the inguinal area at saphenofemoral junction with extrinsic venous compression. This may be below the area imaged on CT.     09/18/2019 - Ultrasound Duplex Lower Extremity Bilateral   1. No evidence of pseudoaneurysm or fluid collection. The evaluated bilateral iliac arteries as well as the common and proximal femoral arteries are unremarkable.  2. Partial thrombus in the left common femoral vein.     09/11/2019 - Cardiac Catheterization/PCI  1. Single vessel coronary artery disease with proximal left anterior descending involvement.  2. Successful PCI of LAD and septal perforators.     09/03/2019 - Cardiac Catheterization (LH)  1. Single vessel coronary artery disease with proximal left anterior descending involvement.  2. Elevated LV filling pressures.  3. Left Ventricular end-diastolic pressure = 19.  4. Normal LV systolic function.      09/13/2017 - Exercise Stress Echo  1. The resting ejection fraction was estimated at 55 to 60% with a peak exercise ejection fraction estimated at 65 to 70%.  2. Normal left ventricular size and function during peak stress.  3. Normal left ventricular size and function.  4. Negative adequate exercise echo for inducible ischemia at a high (approx 13-14 MET) work load.  5. ST changes as described are less specific than echo findings (particularly given postural changes).  6. Unchanged chest discomfort before, during, and after exercise.  7. The adequate level of stress was achieved.    Diagnostic review: I have personally reviewed the result(s) of the [facility].    Assessment/Plan   1) CAD s/p PCI of LAD Sept 2019  On ASA 81 mg daily, atorvastatin 40 mg daily  Home monitoring of BP  Stress 09/13/2024 negative for ischemia   [facility] 09/27/2024 for persistent CP despite negative stress with mild non-obstructive CAD  CP resolved - likely  muscular in nature following excessive strain, per patient   Continue current medical Rx   F/U 1 year     2) Hyperlipidemia  Goal LDL <70  On atorvastatin 40 mg daily, fish oil concentrate 120-180 mg daily  Lipid panel 08/30/2024 with LDL of 82; not at goal  Continue current medical Rx   F/U 1 year      3) Intermittent Tachycardia  Apple Watch reveals sinus tachycardia  Normal echo  Negative TSH  Encouraged to exercise  Stable       Scribe Attestation  By signing my name below, I, Caleb Shetty   attest that this documentation has been prepared under the direction and in the presence of Luciano Roca MD.

## 2024-10-11 ENCOUNTER — OFFICE VISIT (OUTPATIENT)
Dept: CARDIOLOGY | Facility: HOSPITAL | Age: 59
End: 2024-10-11
Payer: COMMERCIAL

## 2024-10-11 VITALS
BODY MASS INDEX: 32.07 KG/M2 | HEIGHT: 70 IN | SYSTOLIC BLOOD PRESSURE: 124 MMHG | WEIGHT: 224 LBS | HEART RATE: 66 BPM | DIASTOLIC BLOOD PRESSURE: 72 MMHG | OXYGEN SATURATION: 97 %

## 2024-10-11 DIAGNOSIS — I25.10 CORONARY ARTERY DISEASE INVOLVING NATIVE CORONARY ARTERY OF NATIVE HEART WITHOUT ANGINA PECTORIS: Primary | ICD-10-CM

## 2024-10-11 PROCEDURE — 99212 OFFICE O/P EST SF 10 MIN: CPT | Performed by: INTERNAL MEDICINE

## 2024-10-11 PROCEDURE — 3008F BODY MASS INDEX DOCD: CPT | Performed by: INTERNAL MEDICINE

## 2024-10-11 RX ORDER — IBUPROFEN 100 MG/5ML
1000 SUSPENSION, ORAL (FINAL DOSE FORM) ORAL DAILY
COMMUNITY

## 2024-10-11 NOTE — PATIENT INSTRUCTIONS
Continue all current medications as prescribed.  Followup with Dr. Roca in 1 year, sooner should any issues or concerns arise before then.     If you have any questions or cardiac concerns, please call our office at [phone].

## 2024-10-17 ENCOUNTER — OFFICE VISIT (OUTPATIENT)
Dept: URGENT CARE | Age: 59
End: 2024-10-17
Payer: COMMERCIAL

## 2024-10-17 ENCOUNTER — ANCILLARY PROCEDURE (OUTPATIENT)
Dept: URGENT CARE | Age: 59
End: 2024-10-17
Payer: COMMERCIAL

## 2024-10-17 VITALS
DIASTOLIC BLOOD PRESSURE: 94 MMHG | SYSTOLIC BLOOD PRESSURE: 160 MMHG | HEART RATE: 108 BPM | TEMPERATURE: 98.2 F | OXYGEN SATURATION: 96 % | RESPIRATION RATE: 16 BRPM

## 2024-10-17 DIAGNOSIS — R05.9 COUGH: ICD-10-CM

## 2024-10-17 DIAGNOSIS — J40 BRONCHITIS: ICD-10-CM

## 2024-10-17 DIAGNOSIS — R05.1 ACUTE COUGH: ICD-10-CM

## 2024-10-17 DIAGNOSIS — J01.10 ACUTE FRONTAL SINUSITIS, RECURRENCE NOT SPECIFIED: Primary | ICD-10-CM

## 2024-10-17 PROCEDURE — 71046 X-RAY EXAM CHEST 2 VIEWS: CPT | Performed by: NURSE PRACTITIONER

## 2024-10-17 RX ORDER — DOXYCYCLINE 100 MG/1
100 CAPSULE ORAL 2 TIMES DAILY
Qty: 20 CAPSULE | Refills: 0 | Status: SHIPPED | OUTPATIENT
Start: 2024-10-17 | End: 2024-10-27

## 2024-10-17 RX ORDER — PREDNISONE 20 MG/1
40 TABLET ORAL DAILY
Qty: 10 TABLET | Refills: 0 | Status: SHIPPED | OUTPATIENT
Start: 2024-10-17 | End: 2024-10-22

## 2024-10-17 RX ORDER — ALBUTEROL SULFATE 90 UG/1
2 INHALANT RESPIRATORY (INHALATION) EVERY 6 HOURS PRN
Qty: 8.5 G | Refills: 0 | Status: SHIPPED | OUTPATIENT
Start: 2024-10-17 | End: 2025-10-17

## 2024-10-17 RX ORDER — ALBUTEROL SULFATE 0.83 MG/ML
2.5 SOLUTION RESPIRATORY (INHALATION) ONCE
Status: COMPLETED | OUTPATIENT
Start: 2024-10-17 | End: 2024-10-17

## 2024-10-17 RX ORDER — BENZONATATE 100 MG/1
100 CAPSULE ORAL 3 TIMES DAILY PRN
Qty: 30 CAPSULE | Refills: 0 | Status: SHIPPED | OUTPATIENT
Start: 2024-10-17 | End: 2024-10-27

## 2024-10-17 ASSESSMENT — ENCOUNTER SYMPTOMS
CHEST TIGHTNESS: 0
CONSTITUTIONAL NEGATIVE: 1
CHOKING: 0
GASTROINTESTINAL NEGATIVE: 1
CARDIOVASCULAR NEGATIVE: 1
MUSCULOSKELETAL NEGATIVE: 1
SORE THROAT: 0
RHINORRHEA: 1
NEUROLOGICAL NEGATIVE: 1
SINUS PAIN: 0
COUGH: 1
SHORTNESS OF BREATH: 0
PSYCHIATRIC NEGATIVE: 1
SINUS PRESSURE: 0
EYES NEGATIVE: 1
WHEEZING: 1

## 2024-10-17 NOTE — PATIENT INSTRUCTIONS
History and examination consistent with acute uncomplicated sinusitis and bronchitis, CXR negative for pneumonia.  Declined Covid testing, No evidence of sepsis, strep pharyngitis, or  pneumonia. No leg swelling, no JVD increase, reviewed Stress test, 61% EF.  Counseled patient/family on antibiotic treatment and supportive measures at  home. Patient advised to return to clinic or present to ED if symptoms change or worsen.  Otherwise follow-up with PCP.   Bronchitis: MDM- History consistent with acute bronchitis. No suspicion pneumonia, sepsis or  other acute cardiopulmonary pathology. I don't feel that additional imaging, labs, or further work up are  warranted at this point. plan is for symptomatic care. advised to be seen in clinic if no  improvement of symptoms. Patient advised to return to clinic or go to the ED if symptoms  change or worsen. patient verbalized understanding and agrees with plan.

## 2024-10-17 NOTE — PROGRESS NOTES
Subjective   Patient ID: Marcello oCwan is a 59 y.o. male. They present today with a chief complaint of Cough (Cough x 3 weeks).    History of Present Illness  60 yo male presents with c/o cough, states he took zithromycin early in October, cough improved, cough started 4 days after completing Zithromycin.  Is unable to sleep lying down because he cannot stop coughing.  Denies SOB, [place]OR[place], rapid weight gain or leg swelling.  States negative Covid yesterday.  Nuclear stress test 9/24, EF 61%      Cough  Associated symptoms include postnasal drip, rhinorrhea and wheezing. Pertinent negatives include no ear pain, sore throat or shortness of breath.       Past Medical History  Allergies as of 10/17/2024 - Reviewed 10/17/2024   Allergen Reaction Noted    Amoxicillin Nausea Only and Other 09/11/2023    Ciprofloxacin Other and Unknown 09/11/2023    Diltiazem hcl Headache 09/11/2023    Isosorbide mononitrate Headache 09/11/2023       (Not in a hospital admission)       Past Medical History:   Diagnosis Date    Coronary angioplasty status 09/24/2019    History of PTCA    Coronary artery disease     Hyperlipidemia        Past Surgical History:   Procedure Laterality Date    APPENDECTOMY  08/29/2017    Appendectomy    CARDIAC CATHETERIZATION N/A 9/27/2024    Procedure: Left Heart Cath;  Surgeon: Luciano Roca MD;  Location: [facility] Cardiac Cath Lab;  Service: Cardiovascular;  Laterality: N/A;    CT AORTA AND BILATERAL ILIOFEMORAL RUNOFF ANGIOGRAM W AND/OR WO IV CONTRAST  9/18/2019    CT AORTA AND BILATERAL ILIOFEMORAL RUNOFF ANGIOGRAM W AND/OR WO IV CONTRAST 9/18/2019 [facility] EMERGENCY LEGAC[facility]    OTHER SURGICAL HISTORY  06/06/2022    Cardiac catheterization with stent placement        reports that he has never smoked. He quit smokeless tobacco use about 5 years ago.  His smokeless tobacco use included chew. He reports that he does not currently use alcohol. He reports that he does not use drugs.    Review of Systems  Review of Systems    Constitutional: Negative.    HENT:  Positive for congestion, postnasal drip and rhinorrhea. Negative for ear pain, sinus pressure, sinus pain, sneezing and sore throat.    Eyes: Negative.    Respiratory:  Positive for cough and wheezing. Negative for choking, chest tightness and shortness of breath.    Cardiovascular: Negative.    Gastrointestinal: Negative.    Musculoskeletal: Negative.    Neurological: Negative.    Psychiatric/Behavioral: Negative.                                    Objective    Vitals:    10/17/24 1440   BP: (!) 160/94   Pulse: 108   Resp: 16   Temp: 36.8 °C (98.2 °F)   SpO2: 96%     No LMP for male patient.    Physical Exam  ENT:  General: Vitals noted, no distress, afebrile. Normal phonation, no stridor, no trismus  ENT: TMs cloidy effusion bilaterally, EACs unremarkable. Mastoids nontender. Posterior oropharynx without erythema or swelling, mucosal drainage posterior pharynx.  Uvula is in the midline and non-edematous. No Marcelo's angina.  Neck: Supple. No meningismus through full range of motion. No lymphadenopathy.   Cardiac: Regular rate and rhythm, no murmur.  Lungs: Coarse wheezing throughout, no rhonchi or crackles, No JVD.  Abdomen: Soft, nontender, nonsurgical throughout. Normoactive bowel sounds.   Extremities: No peripheral edema  Skin: No rash  Neuro: No focal neurologic deficits. NIH score is 0.   Procedures    Point of Care Test & Imaging Results from this visit  No results found for this visit on 10/17/24.   XR chest 2 views    Result Date: 10/17/2024  Interpreted By:  Giovanna Renee, STUDY: Chest, 2 views.   INDICATION: Signs/Symptoms:cough.   COMPARISON: 07/06/2020 chest radiograph and CT chest 05/21/2022.   ACCESSION NUMBER(S): [id number]   ORDERING CLINICIAN: JOCELYNE BELLO   FINDINGS: The cardiomediastinal silhouette size is within normal limits.   There is no focal consolidation, edema or pneumothorax. No sizeable pleural effusion.       1. No acute cardiopulmonary  process.   MACRO: None.   Signed by: Giovanna Renee 10/17/2024 2:57 PM Dictation workstation:   [id number]     Diagnostic study results (if any) were reviewed by DILLON Vega.    Assessment/Plan   Allergies, medications, history, and pertinent labs/EKGs/Imaging reviewed by DILLON Vega.     Medical Decision Making  History and examination consistent with acute uncomplicated sinusitis. No  indication for labs or imaging at this time. No evidence of sepsis, strep pharyngitis, or  pneumonia. Counseled patient/family on antibiotic treatment and supportive measures at  home. Patient advised to return to clinic or present to ED if symptoms change or worsen.  Otherwise follow-up with PCP.   Bronchitis: MDM- History consistent with acute bronchitis. No suspicion pneumonia, sepsis or  other acute cardiopulmonary pathology. I don't feel that imaging, labs, or further work up are  warranted at this point. plan is for symptomatic care. advised to be seen in clinic if no  improvement of symptoms. Patient advised to return to clinic or go to the ED if symptoms  change or worsen. patient verbalized understanding and agrees with plan.     Orders and Diagnoses  Diagnoses and all orders for this visit:  Acute frontal sinusitis, recurrence not specified  -     doxycycline (Vibramycin) 100 mg capsule; Take 1 capsule (100 mg) by mouth 2 times a day for 10 days. Take with at least 8 ounces (large glass) of water, do not lie down for 30 minutes after  Acute cough  -     albuterol 2.5 mg /3 mL (0.083 %) nebulizer solution 2.5 mg  Bronchitis  -     predniSONE (Deltasone) 20 mg tablet; Take 2 tablets (40 mg) by mouth once daily for 5 days.  -     albuterol (ProAir HFA) 90 mcg/actuation inhaler; Inhale 2 puffs every 6 hours if needed for wheezing or shortness of breath.  -     benzonatate (Tessalon) 100 mg capsule; Take 1 capsule (100 mg) by mouth 3 times a day as needed for cough for up to 10 days. Do not crush or  chew.  -     albuterol 2.5 mg /3 mL (0.083 %) nebulizer solution 2.5 mg      Medical Admin Record  Administrations This Visit       albuterol 2.5 mg /3 mL (0.083 %) nebulizer solution 2.5 mg       Admin Date  10/17/2024 Action  Given Dose  2.5 mg Route  nebulization Documented By  Fabienne Parsons MA                    Patient disposition: Home    Electronically signed by DILLON Vega  4:55 PM

## 2024-10-28 ENCOUNTER — PHARMACY VISIT (OUTPATIENT)
Dept: PHARMACY | Facility: CLINIC | Age: 59
End: 2024-10-28
Payer: COMMERCIAL

## 2024-10-28 ENCOUNTER — APPOINTMENT (OUTPATIENT)
Dept: RADIOLOGY | Facility: HOSPITAL | Age: 59
End: 2024-10-28
Payer: COMMERCIAL

## 2024-10-28 ENCOUNTER — HOSPITAL ENCOUNTER (EMERGENCY)
Facility: HOSPITAL | Age: 59
Discharge: HOME | End: 2024-10-28
Payer: COMMERCIAL

## 2024-10-28 VITALS
HEART RATE: 73 BPM | RESPIRATION RATE: 18 BRPM | DIASTOLIC BLOOD PRESSURE: 92 MMHG | TEMPERATURE: 96.8 F | OXYGEN SATURATION: 98 % | SYSTOLIC BLOOD PRESSURE: 157 MMHG | WEIGHT: 210 LBS | BODY MASS INDEX: 30.06 KG/M2 | HEIGHT: 70 IN

## 2024-10-28 DIAGNOSIS — J06.9 UPPER RESPIRATORY TRACT INFECTION, UNSPECIFIED TYPE: Primary | ICD-10-CM

## 2024-10-28 LAB
FLUAV RNA RESP QL NAA+PROBE: NOT DETECTED
FLUBV RNA RESP QL NAA+PROBE: NOT DETECTED
RSV RNA RESP QL NAA+PROBE: NOT DETECTED
SARS-COV-2 RNA RESP QL NAA+PROBE: NOT DETECTED

## 2024-10-28 PROCEDURE — 2500000004 HC RX 250 GENERAL PHARMACY W/ HCPCS (ALT 636 FOR OP/ED): Performed by: NURSE PRACTITIONER

## 2024-10-28 PROCEDURE — RXMED WILLOW AMBULATORY MEDICATION CHARGE

## 2024-10-28 PROCEDURE — 87637 SARSCOV2&INF A&B&RSV AMP PRB: CPT | Performed by: NURSE PRACTITIONER

## 2024-10-28 PROCEDURE — 99283 EMERGENCY DEPT VISIT LOW MDM: CPT

## 2024-10-28 PROCEDURE — 94640 AIRWAY INHALATION TREATMENT: CPT | Mod: 59

## 2024-10-28 PROCEDURE — 71046 X-RAY EXAM CHEST 2 VIEWS: CPT

## 2024-10-28 PROCEDURE — 2500000002 HC RX 250 W HCPCS SELF ADMINISTERED DRUGS (ALT 637 FOR MEDICARE OP, ALT 636 FOR OP/ED): Performed by: NURSE PRACTITIONER

## 2024-10-28 PROCEDURE — 71046 X-RAY EXAM CHEST 2 VIEWS: CPT | Performed by: RADIOLOGY

## 2024-10-28 PROCEDURE — 99284 EMERGENCY DEPT VISIT MOD MDM: CPT

## 2024-10-28 RX ORDER — IPRATROPIUM BROMIDE AND ALBUTEROL SULFATE 2.5; .5 MG/3ML; MG/3ML
3 SOLUTION RESPIRATORY (INHALATION) ONCE
Status: COMPLETED | OUTPATIENT
Start: 2024-10-28 | End: 2024-10-28

## 2024-10-28 RX ORDER — DEXAMETHASONE 4 MG/1
10 TABLET ORAL ONCE
Status: COMPLETED | OUTPATIENT
Start: 2024-10-28 | End: 2024-10-28

## 2024-10-28 RX ORDER — GUAIFENESIN AND PSEUDOEPHEDRINE HCL 1200; 120 MG/1; MG/1
1 TABLET, EXTENDED RELEASE ORAL 2 TIMES DAILY
Qty: 24 TABLET | Refills: 0 | Status: SHIPPED | OUTPATIENT
Start: 2024-10-28

## 2024-10-28 RX ORDER — ALBUTEROL SULFATE 90 UG/1
2 INHALANT RESPIRATORY (INHALATION) EVERY 4 HOURS PRN
Qty: 8.5 G | Refills: 0 | Status: SHIPPED | OUTPATIENT
Start: 2024-10-28 | End: 2024-11-01 | Stop reason: ALTCHOICE

## 2024-10-28 RX ORDER — ALBUTEROL SULFATE 0.83 MG/ML
2.5 SOLUTION RESPIRATORY (INHALATION) ONCE
Status: COMPLETED | OUTPATIENT
Start: 2024-10-28 | End: 2024-10-28

## 2024-10-28 ASSESSMENT — COLUMBIA-SUICIDE SEVERITY RATING SCALE - C-SSRS
6. HAVE YOU EVER DONE ANYTHING, STARTED TO DO ANYTHING, OR PREPARED TO DO ANYTHING TO END YOUR LIFE?: NO
2. HAVE YOU ACTUALLY HAD ANY THOUGHTS OF KILLING YOURSELF?: NO
1. IN THE PAST MONTH, HAVE YOU WISHED YOU WERE DEAD OR WISHED YOU COULD GO TO SLEEP AND NOT WAKE UP?: NO

## 2024-10-28 ASSESSMENT — PAIN DESCRIPTION - DESCRIPTORS: DESCRIPTORS: HEADACHE

## 2024-10-28 ASSESSMENT — PAIN DESCRIPTION - LOCATION: LOCATION: HEAD

## 2024-10-28 ASSESSMENT — PAIN SCALES - GENERAL: PAINLEVEL_OUTOF10: 8

## 2024-10-28 ASSESSMENT — PAIN - FUNCTIONAL ASSESSMENT: PAIN_FUNCTIONAL_ASSESSMENT: 0-10

## 2024-11-01 ENCOUNTER — OFFICE VISIT (OUTPATIENT)
Dept: PULMONOLOGY | Facility: CLINIC | Age: 59
End: 2024-11-01
Payer: COMMERCIAL

## 2024-11-01 VITALS
WEIGHT: 226 LBS | BODY MASS INDEX: 32.43 KG/M2 | SYSTOLIC BLOOD PRESSURE: 136 MMHG | OXYGEN SATURATION: 95 % | HEART RATE: 76 BPM | DIASTOLIC BLOOD PRESSURE: 90 MMHG

## 2024-11-01 DIAGNOSIS — J06.9 UPPER RESPIRATORY TRACT INFECTION, UNSPECIFIED TYPE: ICD-10-CM

## 2024-11-01 DIAGNOSIS — R05.2 SUBACUTE COUGH: Primary | ICD-10-CM

## 2024-11-01 DIAGNOSIS — R06.00 PND (PAROXYSMAL NOCTURNAL DYSPNEA): ICD-10-CM

## 2024-11-01 PROCEDURE — 99204 OFFICE O/P NEW MOD 45 MIN: CPT | Performed by: INTERNAL MEDICINE

## 2024-11-01 PROCEDURE — 99214 OFFICE O/P EST MOD 30 MIN: CPT | Performed by: INTERNAL MEDICINE

## 2024-11-01 PROCEDURE — 1036F TOBACCO NON-USER: CPT | Performed by: INTERNAL MEDICINE

## 2024-11-01 RX ORDER — CODEINE PHOSPHATE AND GUAIFENESIN 10; 100 MG/5ML; MG/5ML
5 SOLUTION ORAL EVERY 8 HOURS PRN
Qty: 105 ML | Refills: 0 | Status: SHIPPED | OUTPATIENT
Start: 2024-11-01 | End: 2024-11-08

## 2024-11-01 RX ORDER — FLUTICASONE PROPIONATE 50 MCG
2 SPRAY, SUSPENSION (ML) NASAL DAILY
Qty: 16 G | Refills: 1 | Status: SHIPPED | OUTPATIENT
Start: 2024-11-01 | End: 2025-04-30

## 2024-11-01 ASSESSMENT — ENCOUNTER SYMPTOMS
WHEEZING: 0
FATIGUE: 0
FEVER: 0
COUGH: 1
SHORTNESS OF BREATH: 0

## 2024-11-12 DIAGNOSIS — E78.5 HYPERLIPIDEMIA, UNSPECIFIED HYPERLIPIDEMIA TYPE: ICD-10-CM

## 2024-11-12 RX ORDER — ATORVASTATIN CALCIUM 40 MG/1
40 TABLET, FILM COATED ORAL NIGHTLY
Qty: 90 TABLET | Refills: 3 | Status: SHIPPED | OUTPATIENT
Start: 2024-11-12

## 2024-11-15 ENCOUNTER — APPOINTMENT (OUTPATIENT)
Dept: PULMONOLOGY | Facility: CLINIC | Age: 59
End: 2024-11-15
Payer: COMMERCIAL

## 2024-12-10 ENCOUNTER — TELEPHONE (OUTPATIENT)
Dept: PRIMARY CARE | Facility: CLINIC | Age: 59
End: 2024-12-10
Payer: COMMERCIAL

## 2024-12-10 DIAGNOSIS — K21.9 GASTROESOPHAGEAL REFLUX DISEASE WITHOUT ESOPHAGITIS: ICD-10-CM

## 2024-12-10 RX ORDER — OMEPRAZOLE 20 MG/1
20 CAPSULE, DELAYED RELEASE ORAL DAILY
Qty: 30 CAPSULE | Refills: 0 | Status: SHIPPED | OUTPATIENT
Start: 2024-12-10

## 2024-12-10 NOTE — TELEPHONE ENCOUNTER
Pt called rx line at 926am requesting pended med    Pt FTS last OV 9/17, pt overdue for follow up  Please call pt and [facility]

## 2024-12-15 RX ORDER — OMEPRAZOLE 20 MG/1
20 CAPSULE, DELAYED RELEASE ORAL DAILY
Qty: 90 CAPSULE | OUTPATIENT
Start: 2024-12-15

## 2024-12-17 ENCOUNTER — APPOINTMENT (OUTPATIENT)
Dept: PRIMARY CARE | Facility: CLINIC | Age: 59
End: 2024-12-17
Payer: COMMERCIAL

## 2024-12-17 VITALS
HEART RATE: 74 BPM | BODY MASS INDEX: 32.43 KG/M2 | TEMPERATURE: 97.6 F | SYSTOLIC BLOOD PRESSURE: 138 MMHG | OXYGEN SATURATION: 98 % | WEIGHT: 226 LBS | DIASTOLIC BLOOD PRESSURE: 82 MMHG

## 2024-12-17 DIAGNOSIS — E78.5 HYPERLIPIDEMIA, UNSPECIFIED HYPERLIPIDEMIA TYPE: ICD-10-CM

## 2024-12-17 DIAGNOSIS — R73.01 IFG (IMPAIRED FASTING GLUCOSE): ICD-10-CM

## 2024-12-17 DIAGNOSIS — I25.10 CORONARY ARTERY DISEASE INVOLVING NATIVE CORONARY ARTERY OF NATIVE HEART WITHOUT ANGINA PECTORIS: ICD-10-CM

## 2024-12-17 DIAGNOSIS — R97.20 ELEVATED PSA: ICD-10-CM

## 2024-12-17 DIAGNOSIS — Z23 ENCOUNTER FOR IMMUNIZATION: ICD-10-CM

## 2024-12-17 DIAGNOSIS — Z12.5 SCREENING FOR MALIGNANT NEOPLASM OF PROSTATE: ICD-10-CM

## 2024-12-17 DIAGNOSIS — K21.9 GASTROESOPHAGEAL REFLUX DISEASE WITHOUT ESOPHAGITIS: Primary | ICD-10-CM

## 2024-12-17 PROCEDURE — 90656 IIV3 VACC NO PRSV 0.5 ML IM: CPT | Performed by: PHYSICIAN ASSISTANT

## 2024-12-17 PROCEDURE — 99214 OFFICE O/P EST MOD 30 MIN: CPT | Performed by: PHYSICIAN ASSISTANT

## 2024-12-17 PROCEDURE — 1036F TOBACCO NON-USER: CPT | Performed by: PHYSICIAN ASSISTANT

## 2024-12-17 PROCEDURE — 90471 IMMUNIZATION ADMIN: CPT | Performed by: PHYSICIAN ASSISTANT

## 2024-12-17 RX ORDER — OMEPRAZOLE 20 MG/1
20 CAPSULE, DELAYED RELEASE ORAL DAILY
Qty: 90 CAPSULE | Refills: 1 | Status: SHIPPED | OUTPATIENT
Start: 2024-12-17

## 2024-12-17 ASSESSMENT — ENCOUNTER SYMPTOMS
SHORTNESS OF BREATH: 0
PALPITATIONS: 0
ABDOMINAL PAIN: 0

## 2024-12-17 ASSESSMENT — PATIENT HEALTH QUESTIONNAIRE - PHQ9
SUM OF ALL RESPONSES TO PHQ9 QUESTIONS 1 AND 2: 0
1. LITTLE INTEREST OR PLEASURE IN DOING THINGS: NOT AT ALL
2. FEELING DOWN, DEPRESSED OR HOPELESS: NOT AT ALL

## 2024-12-17 NOTE — PROGRESS NOTES
Subjective   Patient ID: Marcello Cowan is a 59 y.o. male who presents for GERD and Hyperlipidemia.    HPI   Patient presents for hyperlipidemia, GERD, and CAD.      GERD: Condition has been well controlled since last visit.  Taking and tolerating omeprazole as prescribed. Denies abdominal pain or breakthrough reflux.      Hyperlipidemia: Taking and tolerating Atorvastatin as prescribed.  Restarted doing exercise last week (was not doing as well with that the past several months --treadmill/bike and wts).    LDL Calculated (mg/dL)   Date Value   08/30/2024 82   02/27/2024 94     LDL (mg/dL)   Date Value   09/02/2023 79   02/21/2023 87     Triglycerides (mg/dL)   Date Value   08/30/2024 148   02/27/2024 118     HDL-Cholesterol (mg/dL)   Date Value   08/30/2024 40.9   02/27/2024 49.6     IFG:  glucose fasting 107 8/30/24.   Hemoglobin A1C (%)   Date Value   07/07/2020 5.9     Glucose (mg/dL)   Date Value       08/30/2024 107 (H)         CAD: Doing well. Sees cardiologist Dr Roca --last there 10/11/24.   Denies CP or SOB. Supposed to follow up in a year.  Had stress test and had heart cath 9/2024 that were good.     Had chronic cough after illness 2 months ago.  Saw pulmonologist 11/1/24.  Started him on Flonase and cough resolved.     Did colonoscopy with Dr Clifford on 7/31/23 that showed inflammatory polyp. Repeat in 10 years.     BPH: PSA elevated.  No urinary symptoms. Referred to urologist, Dr Perdomo due to rising PSA last visit -- saw him 4/4/24.  PSA improving on 9/3/24 labs. Told to just monitor.   Lab Results   Component Value Date    PSA 4.35 (H) 09/03/2024    PSA 5.13 (H) 02/27/2024    PSA 3.25 02/21/2023        reports that he has never smoked. He quit smokeless tobacco use about 5 years ago.  His smokeless tobacco use included chew.      Review of Systems   Respiratory:  Negative for shortness of breath.    Cardiovascular:  Negative for chest pain and palpitations.   Gastrointestinal:  Negative for abdominal  pain.     Objective   /82   Pulse 74   Temp 36.4 °C (97.6 °F)   Wt 103 kg (226 lb)   SpO2 98%   BMI 32.43 kg/m²     Physical Exam  Vitals and nursing note reviewed.   Constitutional:       Appearance: Normal appearance. He is well-developed.   Eyes:      General: No scleral icterus.  Cardiovascular:      Rate and Rhythm: Normal rate and regular rhythm.      Heart sounds: No murmur heard.  Pulmonary:      Effort: Pulmonary effort is normal.      Breath sounds: Normal breath sounds.   Abdominal:      Palpations: Abdomen is soft. There is no mass.      Tenderness: There is no abdominal tenderness.   Musculoskeletal:      Cervical back: Neck supple.   Skin:     General: Skin is warm and dry.   Neurological:      Mental Status: He is alert.       Assessment/Plan   Diagnoses and all orders for this visit:  GERD  -     omeprazole (PriLOSEC) 20 mg DR capsule; Take 1 capsule (20 mg) by mouth once daily.  Hyperlipidemia, unspecified hyperlipidemia type  -     Lipid Panel; Future  -     Comprehensive Metabolic Panel; Future  IFG (impaired fasting glucose)  -     Comprehensive Metabolic Panel; Future  -     Hemoglobin A1C; Future  CAD  -     Comprehensive Metabolic Panel; Future  Elevated PSA  Screening for malignant neoplasm of prostate  -     Prostate Specific Antigen; Future  Encounter for immunization  -     Flu vaccine, trivalent, preservative free, age 6 months and greater (Fluraix/Fluzone/Flulaval)       Reviewed labs.   Discussed diet and exercise, and encouraged weight loss.   Refilled meds.   Flu shot given.   Follow up in 6 months for recheck GERD, hyperlipidemia, IFG, CAD with fasting labs the week before.

## 2025-03-22 DIAGNOSIS — K21.9 GASTROESOPHAGEAL REFLUX DISEASE WITHOUT ESOPHAGITIS: ICD-10-CM

## 2025-03-28 RX ORDER — OMEPRAZOLE 20 MG/1
20 CAPSULE, DELAYED RELEASE ORAL DAILY
Qty: 90 CAPSULE | Refills: 1 | OUTPATIENT
Start: 2025-03-28

## 2025-04-25 DIAGNOSIS — K21.9 GASTROESOPHAGEAL REFLUX DISEASE WITHOUT ESOPHAGITIS: ICD-10-CM

## 2025-05-08 RX ORDER — OMEPRAZOLE 20 MG/1
20 CAPSULE, DELAYED RELEASE ORAL DAILY
Qty: 90 CAPSULE | Refills: 1 | OUTPATIENT
Start: 2025-05-08

## 2025-06-17 ENCOUNTER — APPOINTMENT (OUTPATIENT)
Dept: PRIMARY CARE | Facility: CLINIC | Age: 60
End: 2025-06-17
Payer: COMMERCIAL

## 2025-07-19 LAB
ALBUMIN SERPL-MCNC: 4.7 G/DL (ref 3.6–5.1)
ALP SERPL-CCNC: 63 U/L (ref 35–144)
ALT SERPL-CCNC: 19 U/L (ref 9–46)
ANION GAP SERPL CALCULATED.4IONS-SCNC: 9 MMOL/L (CALC) (ref 7–17)
AST SERPL-CCNC: 19 U/L (ref 10–35)
BILIRUB SERPL-MCNC: 1.1 MG/DL (ref 0.2–1.2)
BUN SERPL-MCNC: 19 MG/DL (ref 7–25)
CALCIUM SERPL-MCNC: 9.6 MG/DL (ref 8.6–10.3)
CHLORIDE SERPL-SCNC: 105 MMOL/L (ref 98–110)
CHOLEST SERPL-MCNC: 161 MG/DL
CHOLEST/HDLC SERPL: 3 (CALC)
CO2 SERPL-SCNC: 27 MMOL/L (ref 20–32)
CREAT SERPL-MCNC: 0.9 MG/DL (ref 0.7–1.35)
EGFRCR SERPLBLD CKD-EPI 2021: 97 ML/MIN/1.73M2
EST. AVERAGE GLUCOSE BLD GHB EST-MCNC: 126 MG/DL
EST. AVERAGE GLUCOSE BLD GHB EST-SCNC: 7 MMOL/L
GLUCOSE SERPL-MCNC: 109 MG/DL (ref 65–99)
HBA1C MFR BLD: 6 %
HDLC SERPL-MCNC: 53 MG/DL
LDLC SERPL CALC-MCNC: 83 MG/DL (CALC)
NONHDLC SERPL-MCNC: 108 MG/DL (CALC)
POTASSIUM SERPL-SCNC: 4 MMOL/L (ref 3.5–5.3)
PROT SERPL-MCNC: 7 G/DL (ref 6.1–8.1)
PSA SERPL-MCNC: 5.41 NG/ML
SODIUM SERPL-SCNC: 141 MMOL/L (ref 135–146)
TRIGL SERPL-MCNC: 156 MG/DL

## 2025-07-22 ENCOUNTER — APPOINTMENT (OUTPATIENT)
Dept: PRIMARY CARE | Facility: CLINIC | Age: 60
End: 2025-07-22
Payer: COMMERCIAL

## 2025-07-22 VITALS
OXYGEN SATURATION: 94 % | WEIGHT: 221 LBS | HEART RATE: 103 BPM | DIASTOLIC BLOOD PRESSURE: 78 MMHG | TEMPERATURE: 97.6 F | BODY MASS INDEX: 31.71 KG/M2 | SYSTOLIC BLOOD PRESSURE: 132 MMHG

## 2025-07-22 DIAGNOSIS — Z12.5 SCREENING FOR MALIGNANT NEOPLASM OF PROSTATE: ICD-10-CM

## 2025-07-22 DIAGNOSIS — R97.20 ELEVATED PSA: ICD-10-CM

## 2025-07-22 DIAGNOSIS — R73.01 IFG (IMPAIRED FASTING GLUCOSE): ICD-10-CM

## 2025-07-22 DIAGNOSIS — K21.9 GASTROESOPHAGEAL REFLUX DISEASE WITHOUT ESOPHAGITIS: Primary | ICD-10-CM

## 2025-07-22 DIAGNOSIS — I25.10 CORONARY ARTERY DISEASE INVOLVING NATIVE CORONARY ARTERY OF NATIVE HEART WITHOUT ANGINA PECTORIS: ICD-10-CM

## 2025-07-22 DIAGNOSIS — E78.5 HYPERLIPIDEMIA, UNSPECIFIED HYPERLIPIDEMIA TYPE: ICD-10-CM

## 2025-07-22 PROCEDURE — 99214 OFFICE O/P EST MOD 30 MIN: CPT | Performed by: PHYSICIAN ASSISTANT

## 2025-07-22 RX ORDER — OMEPRAZOLE 20 MG/1
20 CAPSULE, DELAYED RELEASE ORAL DAILY
Qty: 90 CAPSULE | Refills: 1 | Status: SHIPPED | OUTPATIENT
Start: 2025-07-22

## 2025-07-22 ASSESSMENT — ENCOUNTER SYMPTOMS
PALPITATIONS: 0
ABDOMINAL PAIN: 0
SHORTNESS OF BREATH: 0

## 2025-07-22 ASSESSMENT — PATIENT HEALTH QUESTIONNAIRE - PHQ9
SUM OF ALL RESPONSES TO PHQ9 QUESTIONS 1 AND 2: 0
2. FEELING DOWN, DEPRESSED OR HOPELESS: NOT AT ALL
1. LITTLE INTEREST OR PLEASURE IN DOING THINGS: NOT AT ALL

## 2025-07-22 NOTE — PROGRESS NOTES
Subjective   Patient ID: Marcello Cowan is a 60 y.o. male who presents for GERD, Hyperlipidemia, and Coronary Artery Disease (Recheck, review labs).    HPI   Patient presents for hyperlipidemia, GERD, and CAD.      GERD: Condition has been well controlled since last visit.  Taking and tolerating omeprazole as prescribed. Denies abdominal pain or breakthrough reflux.      Hyperlipidemia: Taking and tolerating Atorvastatin as prescribed.  Restarted doing exercise the past few days.  LDL-CHOLESTEROL (mg/dL (calc))   Date Value   07/18/2025 83     LDL Calculated (mg/dL)   Date Value   08/30/2024 82   02/27/2024 94     LDL (mg/dL)   Date Value   09/02/2023 79   02/21/2023 87     TRIGLYCERIDES (mg/dL)   Date Value   07/18/2025 156 (H)     Triglycerides (mg/dL)   Date Value   08/30/2024 148   02/27/2024 118     HDL CHOLESTEROL (mg/dL)   Date Value   07/18/2025 53     HDL-Cholesterol (mg/dL)   Date Value   08/30/2024 40.9   02/27/2024 49.6     IFG:  glucose fasting 107 8/30/24.   HEMOGLOBIN A1c (%)   Date Value   07/18/2025 6.0 (H)     Hemoglobin A1C (%)   Date Value   07/07/2020 5.9     GLUCOSE (mg/dL)   Date Value   07/18/2025 109 (H)     Glucose (mg/dL)   Date Value   09/24/2024 121 (H)   08/30/2024 107 (H)         CAD: Doing well. Sees cardiologist Dr Roca --last there 10/11/24.   Denies CP or SOB. Has follow up 10/13/25.   Had stress test and had heart cath 9/2024 that were good.       Did colonoscopy with Dr Clifford on 7/31/23 that showed inflammatory polyp. Repeat 7/2033.     BPH: PSA elevated.  No urinary symptoms. Saw urologist, Dr Perdomo due to rising PSA last visit -- saw him 4/4/24.  PSA slightly higher on 7/18/25 labs.  Told to just monitor.   Lab Results   Component Value Date    PSA 5.41 (H) 07/18/2025    PSA 4.35 (H) 09/03/2024    PSA 5.13 (H) 02/27/2024        reports that he has never smoked. He quit smokeless tobacco use about 6 years ago.  His smokeless tobacco use included chew.    Wife going to need some  brain surgery due to epilepsy issues.  She can no longer drive.     Review of Systems   Respiratory:  Negative for shortness of breath.    Cardiovascular:  Negative for chest pain and palpitations.   Gastrointestinal:  Negative for abdominal pain.     Objective   /78   Pulse 103   Temp 36.4 °C (97.6 °F)   Wt 100 kg (221 lb)   SpO2 94%   BMI 31.71 kg/m²     Physical Exam  Vitals and nursing note reviewed.   Constitutional:       Appearance: Normal appearance. He is well-developed.     Eyes:      General: No scleral icterus.      Cardiovascular:      Rate and Rhythm: Normal rate and regular rhythm.      Heart sounds: No murmur heard.  Pulmonary:      Effort: Pulmonary effort is normal.      Breath sounds: Normal breath sounds.   Abdominal:      Palpations: Abdomen is soft. There is no mass.      Tenderness: There is no abdominal tenderness.     Musculoskeletal:      Cervical back: Neck supple.     Skin:     General: Skin is warm and dry.     Neurological:      Mental Status: He is alert.       Assessment/Plan   Diagnoses and all orders for this visit:  GERD  -     omeprazole (PriLOSEC) 20 mg DR capsule; Take 1 capsule (20 mg) by mouth once daily.  Hyperlipidemia, unspecified hyperlipidemia type  -     Comprehensive Metabolic Panel; Future  -     Lipid Panel; Future  IFG (impaired fasting glucose)  -     Hemoglobin A1C; Future  -     Comprehensive Metabolic Panel; Future  CAD  -     Comprehensive Metabolic Panel; Future  Elevated PSA  Screening for malignant neoplasm of prostate  -     Prostate Specific Antigen; Future  Other orders  -     Follow Up In Primary Care; Future       Reviewed labs.   Will continue to monitor PSA.   Discussed diet and exercise, and encouraged weight loss.   Refilled meds.   Follow up in 6 months for recheck GERD, hyperlipidemia, IFG, CAD with fasting labs the week before.

## 2026-01-22 ENCOUNTER — APPOINTMENT (OUTPATIENT)
Dept: PRIMARY CARE | Facility: CLINIC | Age: 61
End: 2026-01-22
Payer: COMMERCIAL

## (undated) DEVICE — CATHETER, OPTITORQUE, 6FR, JACKY, BL3.5/2H/100CM

## (undated) DEVICE — GUIDEWIRE, INQUIRE, J TIP, .035 X 210CM, FIXED CORE, DIAGNOSTIC

## (undated) DEVICE — SHEATH, GLIDESHEATH, SLENDER, 6FR 10CM

## (undated) DEVICE — TR BAND, RADIAL COMPRESSION, STANDARD, 24CM